# Patient Record
Sex: MALE | Race: WHITE | Employment: OTHER | URBAN - NONMETROPOLITAN AREA
[De-identification: names, ages, dates, MRNs, and addresses within clinical notes are randomized per-mention and may not be internally consistent; named-entity substitution may affect disease eponyms.]

---

## 2017-04-28 ENCOUNTER — OFFICE VISIT (OUTPATIENT)
Dept: FAMILY MEDICINE | Facility: OTHER | Age: 72
End: 2017-04-28
Attending: FAMILY MEDICINE
Payer: MEDICARE

## 2017-04-28 VITALS
HEART RATE: 84 BPM | DIASTOLIC BLOOD PRESSURE: 92 MMHG | TEMPERATURE: 98.3 F | OXYGEN SATURATION: 95 % | RESPIRATION RATE: 16 BRPM | SYSTOLIC BLOOD PRESSURE: 126 MMHG

## 2017-04-28 DIAGNOSIS — N40.0 BENIGN NODULAR PROSTATIC HYPERPLASIA WITHOUT LOWER URINARY TRACT SYMPTOMS: Primary | ICD-10-CM

## 2017-04-28 DIAGNOSIS — R10.13 ABDOMINAL PAIN, EPIGASTRIC: ICD-10-CM

## 2017-04-28 DIAGNOSIS — R10.9 FLANK PAIN: ICD-10-CM

## 2017-04-28 DIAGNOSIS — M54.6 ACUTE RIGHT-SIDED THORACIC BACK PAIN: ICD-10-CM

## 2017-04-28 DIAGNOSIS — Z12.5 SCREENING FOR PROSTATE CANCER: ICD-10-CM

## 2017-04-28 DIAGNOSIS — L98.9 SKIN LESION: ICD-10-CM

## 2017-04-28 DIAGNOSIS — Z23 NEED FOR PROPHYLACTIC VACCINATION AND INOCULATION AGAINST COMBINATIONS OF DISEASE: ICD-10-CM

## 2017-04-28 DIAGNOSIS — Z13.6 SCREENING, HEART DISEASE, ISCHEMIC: ICD-10-CM

## 2017-04-28 PROBLEM — M10.9 ACUTE GOUTY ARTHRITIS: Status: ACTIVE | Noted: 2017-04-28

## 2017-04-28 LAB
ALBUMIN SERPL-MCNC: 3.9 G/DL (ref 3.4–5)
ALBUMIN UR-MCNC: NEGATIVE MG/DL
ALP SERPL-CCNC: 69 U/L (ref 40–150)
ALT SERPL W P-5'-P-CCNC: 44 U/L (ref 0–70)
ANION GAP SERPL CALCULATED.3IONS-SCNC: 10 MMOL/L (ref 3–14)
APPEARANCE UR: CLEAR
AST SERPL W P-5'-P-CCNC: 18 U/L (ref 0–45)
BASOPHILS # BLD AUTO: 0 10E9/L (ref 0–0.2)
BASOPHILS NFR BLD AUTO: 0.5 %
BILIRUB SERPL-MCNC: 0.7 MG/DL (ref 0.2–1.3)
BILIRUB UR QL STRIP: NEGATIVE
BUN SERPL-MCNC: 22 MG/DL (ref 7–30)
CALCIUM SERPL-MCNC: 8.7 MG/DL (ref 8.5–10.1)
CHLORIDE SERPL-SCNC: 107 MMOL/L (ref 94–109)
CHOLEST SERPL-MCNC: 192 MG/DL
CO2 SERPL-SCNC: 24 MMOL/L (ref 20–32)
COLOR UR AUTO: YELLOW
CREAT SERPL-MCNC: 0.81 MG/DL (ref 0.66–1.25)
DIFFERENTIAL METHOD BLD: NORMAL
EOSINOPHIL # BLD AUTO: 0.1 10E9/L (ref 0–0.7)
EOSINOPHIL NFR BLD AUTO: 1.6 %
ERYTHROCYTE [DISTWIDTH] IN BLOOD BY AUTOMATED COUNT: 12.7 % (ref 10–15)
GFR SERPL CREATININE-BSD FRML MDRD: ABNORMAL ML/MIN/1.7M2
GLUCOSE SERPL-MCNC: 119 MG/DL (ref 70–99)
GLUCOSE UR STRIP-MCNC: NEGATIVE MG/DL
HCT VFR BLD AUTO: 46.7 % (ref 40–53)
HDLC SERPL-MCNC: 53 MG/DL
HGB BLD-MCNC: 16.2 G/DL (ref 13.3–17.7)
HGB UR QL STRIP: NEGATIVE
IMM GRANULOCYTES # BLD: 0 10E9/L (ref 0–0.4)
IMM GRANULOCYTES NFR BLD: 0.3 %
KETONES UR STRIP-MCNC: NEGATIVE MG/DL
LDLC SERPL CALC-MCNC: 113 MG/DL
LEUKOCYTE ESTERASE UR QL STRIP: NEGATIVE
LYMPHOCYTES # BLD AUTO: 2.2 10E9/L (ref 0.8–5.3)
LYMPHOCYTES NFR BLD AUTO: 25.4 %
MCH RBC QN AUTO: 32 PG (ref 26.5–33)
MCHC RBC AUTO-ENTMCNC: 34.7 G/DL (ref 31.5–36.5)
MCV RBC AUTO: 92 FL (ref 78–100)
MONOCYTES # BLD AUTO: 0.6 10E9/L (ref 0–1.3)
MONOCYTES NFR BLD AUTO: 6.7 %
NEUTROPHILS # BLD AUTO: 5.7 10E9/L (ref 1.6–8.3)
NEUTROPHILS NFR BLD AUTO: 65.5 %
NITRATE UR QL: NEGATIVE
NONHDLC SERPL-MCNC: 139 MG/DL
NRBC # BLD AUTO: 0 10*3/UL
NRBC BLD AUTO-RTO: 0 /100
PH UR STRIP: 5.5 PH (ref 4.7–8)
PLATELET # BLD AUTO: 170 10E9/L (ref 150–450)
POTASSIUM SERPL-SCNC: 4.2 MMOL/L (ref 3.4–5.3)
PROT SERPL-MCNC: 7.7 G/DL (ref 6.8–8.8)
PSA SERPL-ACNC: 0.78 UG/L (ref 0–4)
RBC # BLD AUTO: 5.06 10E12/L (ref 4.4–5.9)
SODIUM SERPL-SCNC: 141 MMOL/L (ref 133–144)
SP GR UR STRIP: 1.01 (ref 1–1.03)
TRIGL SERPL-MCNC: 129 MG/DL
URN SPEC COLLECT METH UR: NORMAL
UROBILINOGEN UR STRIP-MCNC: NORMAL MG/DL (ref 0–2)
WBC # BLD AUTO: 8.7 10E9/L (ref 4–11)

## 2017-04-28 PROCEDURE — 99212 OFFICE O/P EST SF 10 MIN: CPT

## 2017-04-28 PROCEDURE — 36415 COLL VENOUS BLD VENIPUNCTURE: CPT | Performed by: FAMILY MEDICINE

## 2017-04-28 PROCEDURE — 81003 URINALYSIS AUTO W/O SCOPE: CPT | Performed by: FAMILY MEDICINE

## 2017-04-28 PROCEDURE — 90714 TD VACC NO PRESV 7 YRS+ IM: CPT | Performed by: FAMILY MEDICINE

## 2017-04-28 PROCEDURE — 85025 COMPLETE CBC W/AUTO DIFF WBC: CPT | Performed by: FAMILY MEDICINE

## 2017-04-28 PROCEDURE — 90472 IMMUNIZATION ADMIN EACH ADD: CPT | Mod: GY | Performed by: FAMILY MEDICINE

## 2017-04-28 PROCEDURE — G0009 ADMIN PNEUMOCOCCAL VACCINE: HCPCS | Performed by: FAMILY MEDICINE

## 2017-04-28 PROCEDURE — G0103 PSA SCREENING: HCPCS | Performed by: FAMILY MEDICINE

## 2017-04-28 PROCEDURE — 90471 IMMUNIZATION ADMIN: CPT | Performed by: FAMILY MEDICINE

## 2017-04-28 PROCEDURE — 80061 LIPID PANEL: CPT | Performed by: FAMILY MEDICINE

## 2017-04-28 PROCEDURE — 84153 ASSAY OF PSA TOTAL: CPT | Performed by: FAMILY MEDICINE

## 2017-04-28 PROCEDURE — 80053 COMPREHEN METABOLIC PANEL: CPT | Performed by: FAMILY MEDICINE

## 2017-04-28 PROCEDURE — 90732 PPSV23 VACC 2 YRS+ SUBQ/IM: CPT | Performed by: FAMILY MEDICINE

## 2017-04-28 PROCEDURE — 99215 OFFICE O/P EST HI 40 MIN: CPT | Performed by: FAMILY MEDICINE

## 2017-04-28 ASSESSMENT — PAIN SCALES - GENERAL: PAINLEVEL: MODERATE PAIN (4)

## 2017-04-28 NOTE — PROGRESS NOTES
SUBJECTIVE:  Ugo Moss, 72 year old, male is seen with the following medical issues.    He has developed recurrent right flank and thoracic pain. Present over the last several weeks.  He describes this as a dull and aching sensation.  This will improve with position.  Ugo is concerned as his brother was recently diagnosed with stomach cancer.  He notes no traumas.  No radiating symptoms.    In addition, he notes intermittent reflux symptoms.  He spends a fair amount of time with his brother who is H pylori positive.  Requests testing.    Follow up benign prostatic hypertrophy.  His symptoms are controlled and do not require medication..  He has not had a recent PSA.    He notes a skin lesion. Present over the last several months.  Located on the dorsum of the left hand. Somewhat scaling in nature.  Ugo spends the womack in [place].    Requests screening labs.    Immunizations to be updated.    Denies chest pain, dyspnea, decreased exercise tolerance, dizzyness, nausea, vomiting, diaphoresis, palpitations, numbness, tingling, or paresthesias.      No current outpatient prescriptions on file.        Allergies   Allergen Reactions     Amoxicillin      Amoxicillin Trihydrate Other (See Comments)     Augmentin       Clavulanic Acid Potassium Other (See Comments)     Augmentin         Past Medical History:   Diagnosis Date     BPH 11/28/2011     Erectile Dysfunction 11/28/2011     History reviewed. No pertinent surgical history.  History reviewed. No pertinent family history.  Social History     Social History     Marital status:      Spouse name: N/A     Number of children: N/A     Years of education: N/A     Occupational History     Not on file.     Social History Main Topics     Smoking status: Never Smoker     Smokeless tobacco: Never Used     Alcohol use Yes      Comment: wine 3-4 times weekly     Drug use: No     Sexual activity: Not on file     Other Topics Concern     Not on file     Social  History Narrative         Review Of Systems  Constitutional, HEENT, cardiovascular, pulmonary, gi and gu systems are negative, except as otherwise noted.    OBJECTIVE:  Vitals: B/P: 126/92, T: 98.3, P: 84, R: 16    Exam:  Physical Exam   Constitutional: He is oriented to person, place, and time. He appears well-developed and well-nourished. No distress.   Neurological: He is alert and oriented to person, place, and time.   Skin: Skin is warm and dry.   There is what appears to be an actinic keratosis noted in the area described above.   Psychiatric: He has a normal mood and affect.     Other exam not repeated    Labs:  Office Visit on 10/28/2016   Component Date Value Ref Range Status     Color Urine 10/28/2016 Yellow   Final     Appearance Urine 10/28/2016 Clear   Final     Glucose Urine 10/28/2016 Negative  NEG mg/dL Final     Bilirubin Urine 10/28/2016 Negative  NEG Final     Ketones Urine 10/28/2016 Negative  NEG mg/dL Final     Specific Gravity Urine 10/28/2016 1.012  1.003 - 1.035 Final     Blood Urine 10/28/2016 Negative  NEG Final     pH Urine 10/28/2016 5.5  4.7 - 8.0 pH Final     Protein Albumin Urine 10/28/2016 Negative  NEG mg/dL Final     Urobilinogen mg/dL 10/28/2016 Normal  0.0 - 2.0 mg/dL Final     Nitrite Urine 10/28/2016 Negative  NEG Final     Leukocyte Esterase Urine 10/28/2016 Negative  NEG Final     Source 10/28/2016 Midstream Urine   Final     WBC Urine 10/28/2016 1  0 - 2 /HPF Final     RBC Urine 10/28/2016 <1  0 - 2 /HPF Final     Mucous Urine 10/28/2016 Present* NEG /LPF Final     WBC 10/28/2016 6.8  4.0 - 11.0 10e9/L Final     RBC Count 10/28/2016 5.39  4.4 - 5.9 10e12/L Final     Hemoglobin 10/28/2016 17.5  13.3 - 17.7 g/dL Final     Hematocrit 10/28/2016 50.0  40.0 - 53.0 % Final     MCV 10/28/2016 93  78 - 100 fl Final     MCH 10/28/2016 32.5  26.5 - 33.0 pg Final     MCHC 10/28/2016 35.0  31.5 - 36.5 g/dL Final     RDW 10/28/2016 12.4  10.0 - 15.0 % Final     Platelet Count 10/28/2016  197  150 - 450 10e9/L Final     Diff Method 10/28/2016 Automated Method   Final     % Neutrophils 10/28/2016 55.4  % Final     % Lymphocytes 10/28/2016 34.1  % Final     % Monocytes 10/28/2016 7.4  % Final     % Eosinophils 10/28/2016 2.1  % Final     % Basophils 10/28/2016 0.9  % Final     % Immature Granulocytes 10/28/2016 0.1  % Final     Nucleated RBCs 10/28/2016 0  0 /100 Final     Absolute Neutrophil 10/28/2016 3.8  1.6 - 8.3 10e9/L Final     Absolute Lymphocytes 10/28/2016 2.3  0.8 - 5.3 10e9/L Final     Absolute Monocytes 10/28/2016 0.5  0.0 - 1.3 10e9/L Final     Absolute Eosinophils 10/28/2016 0.1  0.0 - 0.7 10e9/L Final     Absolute Basophils 10/28/2016 0.1  0.0 - 0.2 10e9/L Final     Abs Immature Granulocytes 10/28/2016 0.0  0 - 0.4 10e9/L Final     Absolute Nucleated RBC 10/28/2016 0.0   Final     PSA 10/28/2016 0.83  0 - 4 ug/L Final    Assay Method:  Chemiluminescence using Siemens Vista analyzer     Sodium 10/28/2016 139  133 - 144 mmol/L Final     Potassium 10/28/2016 4.4  3.4 - 5.3 mmol/L Final     Chloride 10/28/2016 107  94 - 109 mmol/L Final     Carbon Dioxide 10/28/2016 24  20 - 32 mmol/L Final     Anion Gap 10/28/2016 8  3 - 14 mmol/L Final     Glucose 10/28/2016 113* 70 - 99 mg/dL Final     Urea Nitrogen 10/28/2016 20  7 - 30 mg/dL Final     Creatinine 10/28/2016 1.00  0.66 - 1.25 mg/dL Final     GFR Estimate 10/28/2016 74  >60 mL/min/1.7m2 Final    Non  GFR Calc     GFR Estimate If Black 10/28/2016 89  >60 mL/min/1.7m2 Final    African American GFR Calc     Calcium 10/28/2016 8.8  8.5 - 10.1 mg/dL Final     Bilirubin Total 10/28/2016 0.5  0.2 - 1.3 mg/dL Final     Albumin 10/28/2016 4.1  3.4 - 5.0 g/dL Final     Protein Total 10/28/2016 8.2  6.8 - 8.8 g/dL Final     Alkaline Phosphatase 10/28/2016 75  40 - 150 U/L Final     ALT 10/28/2016 38  0 - 70 U/L Final     AST 10/28/2016 17  0 - 45 U/L Final       ASSESSMENT/PLAN:  Acute right-sided thoracic back pain  Suspect  musculoskeletal in nature.  Continue over the counter nonsteroidal anti inflammatory drugs, stretching, and heat.    Flank pain  UA reviewed and negative.  No evidence of stone.  He has a previous history of prostatitis.  Will follow.  - UA reflex to Microscopic and Culture  - CBC with platelets differential    Abdominal pain, epigastric  Most recent CT reviewed and showed no evidence of malignancy.  Suspect GERD.  H pylori breath test scheduled.  Follow up as arranged.  - H pylori breath test; Future    BPH (benign prostatic hyperplasia)  Symptoms controlled without medications.  Will follow.    Skin lesion  Appears to be actinic in nature.  This is discussed.  Recommend Dermatology referral for general skin exam  - DERMATOLOGY REFERRAL    Screening for prostate cancer  PSA drawn.  - Prostate spec antigen screen    Screening, heart disease, ischemic  Fasting labs are drawn.  - Comprehensive metabolic panel  - Lipid Profile    Need for prophylactic vaccination and inoculation against combinations of disease  Immunizations updated.  - ADMIN 1st VACCINE  - VACCINE ADMINISTRATION, EACH ADDITIONAL  - PNEUMOCOCCAL VACCINE,ADULT,SQ OR IM  - C TD PRSERV FREE >=7 YRS ADS IM      Greater than 40 minutes spent with the patient, of which over 50 % spent reviewing medical problems and formulating diagnostic plan, counseling regarding health care issues., and coordination of care.            Mahad Henley MD

## 2017-04-28 NOTE — MR AVS SNAPSHOT
After Visit Summary   4/28/2017    Ugo Moss    MRN: [id number]           Patient Information     Date Of Birth          1945        Visit Information        Provider Department      4/28/2017 11:00 AM Mahad Henley MD [facility] [place]        Today's Diagnoses     BPH (benign prostatic hyperplasia)    -  1    Acute right-sided thoracic back pain        Flank pain        Abdominal pain, epigastric        Skin lesion        Screening for prostate cancer        Screening, heart disease, ischemic        Need for prophylactic vaccination and inoculation against combinations of disease          Care Instructions    H pylori scheduled.  Appointment with Dermatology scheduled for evaluation and general skin examt.         Follow-ups after your visit        Additional Services     DERMATOLOGY REFERRAL       Your provider has referred you to: Dr Ingris Yeboah [facility]    General Skin Exam    Please be aware that coverage of these services is subject to the terms and limitations of your health insurance plan.  Call member services at your health plan with any benefit or coverage questions.      Please bring the following to your appointment:  Any x-rays, CTs or MRIs which have been performed.  Contact the facility where they were done to arrange for  prior to your scheduled appointment.  Any new CT, MRI or other procedures ordered by your specialist must be performed at a [facility] facility or coordinated by your clinic's referral office.    List of current medications   This referral request   Any documents/labs given to you for this referral                  Follow-up notes from your care team     Return in about 1 week (around 5/5/2017) for Test Results.      Who to contact     If you have questions or need follow up information about today's clinic visit or your schedule please contact [facility] directly at [phone].  Normal or non-critical lab and imaging results  will be communicated to you by [employer], letter or phone within 4 business days after the clinic has received the results. If you do not hear from us within 7 days, please contact the clinic through [employer] or phone. If you have a critical or abnormal lab result, we will notify you by phone as soon as possible.  Submit refill requests through [employer] or call your pharmacy and they will forward the refill request to us. Please allow 3 business days for your refill to be completed.          Additional Information About Your Visit        [employer] Information     [employer] gives you secure access to your electronic health record. If you see a primary care provider, you can also send messages to your care team and make appointments. If you have questions, please call your primary care clinic.  If you do not have a primary care provider, please call [phone] and they will assist you.        Care EveryWhere ID     This is your Care EveryWhere ID. This could be used by other organizations to access your [facility] medical records  [id number]        Your Vitals Were     Pulse Temperature Respirations Pulse Oximetry          84 98.3  F (36.8  C) (Tympanic) 16 95%         Blood Pressure from Last 3 Encounters:   04/28/17 (!) 126/92   10/28/16 (!) 142/94   03/09/15 140/80    Weight from Last 3 Encounters:   10/28/16 222 lb (100.7 kg)   03/09/15 195 lb (88.5 kg)   03/05/15 198 lb (89.8 kg)              We Performed the Following     ADMIN 1st VACCINE     C TD PRSERV FREE >=7 YRS ADS IM     CBC with platelets differential     Comprehensive metabolic panel     DERMATOLOGY REFERRAL     Lipid Profile     PNEUMOCOCCAL VACCINE,ADULT,SQ OR IM     Prostate spec antigen screen     UA reflex to Microscopic and Culture     VACCINE ADMINISTRATION, EACH ADDITIONAL          Today's Medication Changes          These changes are accurate as of: 4/28/17 11:59 PM.  If you have any questions, ask your nurse or doctor.               Stop taking  these medicines if you haven't already. Please contact your care team if you have questions.     ciprofloxacin 500 MG tablet   Commonly known as:  CIPRO   Stopped by:  Mahad Henley MD                    Primary Care Provider Office Phone # Fax #    Mahad Henley [phone]       [facility] [address] [place] [address]        Thank you!     Thank you for choosing [facility]  for your care. Our goal is always to provide you with excellent care. Hearing back from our patients is one way we can continue to improve our services. Please take a few minutes to complete the written survey that you may receive in the mail after your visit with us. Thank you!             Your Updated Medication List - Protect others around you: Learn how to safely use, store and throw away your medicines at www.disposemymeds.org.      Notice  As of 4/28/2017 11:59 PM    You have not been prescribed any medications.

## 2017-05-01 NOTE — PATIENT INSTRUCTIONS
H pylori scheduled.  Appointment with Dermatology scheduled for evaluation and general skin examt.

## 2017-05-19 ENCOUNTER — MYC MEDICAL ADVICE (OUTPATIENT)
Dept: FAMILY MEDICINE | Facility: OTHER | Age: 72
End: 2017-05-19

## 2017-05-19 DIAGNOSIS — R59.1 LA (LYMPHADENOPATHY): Primary | ICD-10-CM

## 2017-05-25 DIAGNOSIS — M10.9 ACUTE GOUTY ARTHRITIS: Primary | ICD-10-CM

## 2017-05-25 DIAGNOSIS — R10.13 ABDOMINAL PAIN, EPIGASTRIC: Primary | ICD-10-CM

## 2017-05-30 DIAGNOSIS — R59.1 LA (LYMPHADENOPATHY): Primary | ICD-10-CM

## 2017-05-31 DIAGNOSIS — R59.1 LA (LYMPHADENOPATHY): ICD-10-CM

## 2017-05-31 DIAGNOSIS — R10.13 ABDOMINAL PAIN, EPIGASTRIC: ICD-10-CM

## 2017-05-31 DIAGNOSIS — M10.9 ACUTE GOUTY ARTHRITIS: ICD-10-CM

## 2017-05-31 LAB
BASOPHILS # BLD AUTO: 0.1 10E9/L (ref 0–0.2)
BASOPHILS NFR BLD AUTO: 1.1 %
COPATH REPORT: NORMAL
DIFFERENTIAL METHOD BLD: NORMAL
EOSINOPHIL # BLD AUTO: 0.2 10E9/L (ref 0–0.7)
EOSINOPHIL NFR BLD AUTO: 3.1 %
ERYTHROCYTE [DISTWIDTH] IN BLOOD BY AUTOMATED COUNT: 12.7 % (ref 10–15)
HCT VFR BLD AUTO: 48.9 % (ref 40–53)
HGB BLD-MCNC: 16.8 G/DL (ref 13.3–17.7)
IMM GRANULOCYTES # BLD: 0 10E9/L (ref 0–0.4)
IMM GRANULOCYTES NFR BLD: 0.3 %
LYMPHOCYTES # BLD AUTO: 2.3 10E9/L (ref 0.8–5.3)
LYMPHOCYTES NFR BLD AUTO: 37.1 %
MCH RBC QN AUTO: 31.5 PG (ref 26.5–33)
MCHC RBC AUTO-ENTMCNC: 34.4 G/DL (ref 31.5–36.5)
MCV RBC AUTO: 92 FL (ref 78–100)
MONOCYTES # BLD AUTO: 0.5 10E9/L (ref 0–1.3)
MONOCYTES NFR BLD AUTO: 7.8 %
NEUTROPHILS # BLD AUTO: 3.1 10E9/L (ref 1.6–8.3)
NEUTROPHILS NFR BLD AUTO: 50.6 %
NRBC # BLD AUTO: 0 10*3/UL
NRBC BLD AUTO-RTO: 0 /100
PLATELET # BLD AUTO: 178 10E9/L (ref 150–450)
RBC # BLD AUTO: 5.33 10E12/L (ref 4.4–5.9)
RETICS # AUTO: 90.6 10E9/L (ref 25–95)
RETICS/RBC NFR AUTO: 1.7 % (ref 0.5–2)
WBC # BLD AUTO: 6.1 10E9/L (ref 4–11)

## 2017-05-31 PROCEDURE — 85025 COMPLETE CBC W/AUTO DIFF WBC: CPT | Mod: ZL | Performed by: FAMILY MEDICINE

## 2017-05-31 PROCEDURE — 85045 AUTOMATED RETICULOCYTE COUNT: CPT | Mod: ZL | Performed by: FAMILY MEDICINE

## 2017-05-31 PROCEDURE — 40000847 ZZHCL STATISTIC MORPHOLOGY W/INTERP HISTOLOGY TC 85060: Mod: ZL | Performed by: FAMILY MEDICINE

## 2017-05-31 PROCEDURE — 36415 COLL VENOUS BLD VENIPUNCTURE: CPT | Mod: ZL | Performed by: FAMILY MEDICINE

## 2017-05-31 PROCEDURE — 78267 UREA BREATH TST C-14 ACQUISJ: CPT | Mod: ZL | Performed by: FAMILY MEDICINE

## 2017-05-31 PROCEDURE — 78268 UREA BREATH TEST C-14 ALYS: CPT | Mod: ZL | Performed by: FAMILY MEDICINE

## 2017-06-01 ENCOUNTER — MYC MEDICAL ADVICE (OUTPATIENT)
Dept: FAMILY MEDICINE | Facility: OTHER | Age: 72
End: 2017-06-01

## 2017-06-01 LAB — UREA BREATH TEST QL: 12 DPM

## 2017-06-29 ENCOUNTER — MYC MEDICAL ADVICE (OUTPATIENT)
Dept: FAMILY MEDICINE | Facility: OTHER | Age: 72
End: 2017-06-29

## 2017-06-29 DIAGNOSIS — L02.92 FURUNCLE OF SKIN OR SUBCUTANEOUS TISSUE: Primary | ICD-10-CM

## 2017-06-30 RX ORDER — CEPHALEXIN 500 MG/1
500 CAPSULE ORAL 3 TIMES DAILY
Qty: 30 CAPSULE | Refills: 0 | Status: SHIPPED | OUTPATIENT
Start: 2017-06-30 | End: 2017-07-14

## 2017-07-05 ENCOUNTER — OFFICE VISIT (OUTPATIENT)
Dept: SURGERY | Facility: OTHER | Age: 72
End: 2017-07-05
Attending: SURGERY
Payer: MEDICARE

## 2017-07-05 VITALS
OXYGEN SATURATION: 97 % | WEIGHT: 215 LBS | HEIGHT: 70 IN | BODY MASS INDEX: 30.78 KG/M2 | DIASTOLIC BLOOD PRESSURE: 80 MMHG | SYSTOLIC BLOOD PRESSURE: 130 MMHG | TEMPERATURE: 97.4 F | HEART RATE: 79 BPM

## 2017-07-05 DIAGNOSIS — L72.3 SEBACEOUS CYST: Primary | ICD-10-CM

## 2017-07-05 PROCEDURE — 12032 INTMD RPR S/A/T/EXT 2.6-7.5: CPT | Performed by: SURGERY

## 2017-07-05 PROCEDURE — 11406 EXC TR-EXT B9+MARG >4.0 CM: CPT | Performed by: SURGERY

## 2017-07-05 PROCEDURE — 88304 TISSUE EXAM BY PATHOLOGIST: CPT | Mod: TC | Performed by: SURGERY

## 2017-07-05 PROCEDURE — 99214 OFFICE O/P EST MOD 30 MIN: CPT | Mod: 25

## 2017-07-05 ASSESSMENT — PAIN SCALES - GENERAL: PAINLEVEL: MODERATE PAIN (4)

## 2017-07-05 NOTE — PATIENT INSTRUCTIONS
Thank you for allowing Dr. Tomas and our surgical team to participate in your care.  If you have a scheduling or an appointment question please contact Est[facility]an our Health Unit Coordinator at her direct line [phone].   ALL nursing questions or concerns can be directed to your surgical nurse at: [phone] for [facility].      POST PROCEDURE INSTRUCTIONS    Leave your dressing on for 48 hours, remove dressing    Do not soak and scrub the area. Let the soapy water run over the area.     Keep incision clean and dry   Do NOT soak in water such as a tub bath or swimming   Do NOT put make-up, deodorant, powders, hairspray, lotions, etc on the incision    Apply antibiotic ointment daily    Cover with a clean dressing daily or when wet/soiled    If you have steri-strips, these will fall off on their own in 7 days. If they are still adhered after 7 days, you may remove them by pulling gently.     Do NOT use aspirin/NSAIDS (Motrin, Ibuprofen, Aleve, etc..) for 7 days    You can use acetaminophen(Tylenol) or the prescription you received for pain.       If you have any bleeding, cover the wound with clean gauze and hold pressure for 10 Minutes. If the bleeding does not stop or is heavy and profuse, call the clinic or go to the Urgent Care/Emergency Department.      SIGNS OF INFECTION ARE:    Redness, swelling, red streaks, pus, drainage, warmth, fever, increased pain, foul smell.     Contact your health care provider if you notice any of the warning signs.     FOLLOW - UP    Follow-up in clinic with Dr. Tomas on  for suture removal.     Pathology results will also be discussed at that time.

## 2017-07-05 NOTE — MR AVS SNAPSHOT
After Visit Summary   7/5/2017    Ugo Moss    MRN: [id number]           Patient Information     Date Of Birth          1945        Visit Information        Provider Department      7/5/2017 3:30 PM Christin Tomas MD [facility] [place]        Today's Diagnoses     Sebaceous cyst    -  1      Care Instructions    Thank you for allowing Dr. Tomas and our surgical team to participate in your care.  If you have a scheduling or an appointment question please contact [facility] our Health Unit Coordinator at her direct line [phone].   ALL nursing questions or concerns can be directed to your surgical nurse at: [phone] for [facility].      POST PROCEDURE INSTRUCTIONS    Leave your dressing on for 48 hours, remove dressing    Do not soak and scrub the area. Let the soapy water run over the area.     Keep incision clean and dry   Do NOT soak in water such as a tub bath or swimming   Do NOT put make-up, deodorant, powders, hairspray, lotions, etc on the incision    Apply antibiotic ointment daily    Cover with a clean dressing daily or when wet/soiled    If you have steri-strips, these will fall off on their own in 7 days. If they are still adhered after 7 days, you may remove them by pulling gently.     Do NOT use aspirin/NSAIDS (Motrin, Ibuprofen, Aleve, etc..) for 7 days    You can use acetaminophen(Tylenol) or the prescription you received for pain.       If you have any bleeding, cover the wound with clean gauze and hold pressure for 10 Minutes. If the bleeding does not stop or is heavy and profuse, call the clinic or go to the Urgent Care/Emergency Department.      SIGNS OF INFECTION ARE:    Redness, swelling, red streaks, pus, drainage, warmth, fever, increased pain, foul smell.     Contact your health care provider if you notice any of the warning signs.     FOLLOW - UP    Follow-up in clinic with Dr. Tomas on  for suture removal.     Pathology results will also be discussed at that  time.                 Follow-ups after your visit        Your next 10 appointments already scheduled     Jul 05, 2017  3:30 PM CDT   (Arrive by 3:15 PM)   PROCEDURE with Christin Tomas MD   [facility] ([facility]    [address] [place] [address]   [phone]            Sep 18, 2017  2:00 PM CDT   (Arrive by 1:45 PM)   New Visit with KOLE Amado MD   [facility] ([facility]    [address] [place] [id number] [phone]              Who to contact     If you have questions or need follow up information about today's clinic visit or your schedule please contact [facility] directly at [phone].  Normal or non-critical lab and imaging results will be communicated to you by MyChart, letter or phone within 4 business days after the clinic has received the results. If you do not hear from us within 7 days, please contact the clinic through [employer] or phone. If you have a critical or abnormal lab result, we will notify you by phone as soon as possible.  Submit refill requests through [employer] or call your pharmacy and they will forward the refill request to us. Please allow 3 business days for your refill to be completed.          Additional Information About Your Visit        [employer] Information     [employer] gives you secure access to your electronic health record. If you see a primary care provider, you can also send messages to your care team and make appointments. If you have questions, please call your primary care clinic.  If you do not have a primary care provider, please call [phone] and they will assist you.        Care EveryWhere ID     This is your Care EveryWhere ID. This could be used by other organizations to access your [facility] medical records  [id number]        Your Vitals Were     Pulse Temperature Height Pulse Oximetry BMI (Body Mass Index)       79 97.4  F (36.3  C) (Tympanic) 5'  "10\" (1.778 m) 97% 30.85 kg/m2        Blood Pressure from Last 3 Encounters:   07/05/17 130/80   04/28/17 (!) 126/92   10/28/16 (!) 142/94    Weight from Last 3 Encounters:   07/05/17 215 lb (97.5 kg)   10/28/16 222 lb (100.7 kg)   03/09/15 195 lb (88.5 kg)              We Performed the Following     Surgical pathology exam     SURGICAL TRAY-- MINOR        Primary Care Provider Office Phone # Fax #    Mahad Henley [phone]       [facility] [address] [address]        Equal Access to Services     EVANGELINA AL : Hadii anthony culp hadasho Sokim, waaxda luqadaha, qaybta kaalmada adeegyada, arsenio vences . So [facility] [phone].    ATENCIÓN: Si habla español, tiene a herrera disposición servicios gratuitos de asistencia lingüística. Llame al [phone].    We comply with applicable federal civil rights laws and [place] laws. We do not discriminate on the basis of race, color, national origin, age, disability sex, sexual orientation or gender identity.            Thank you!     Thank you for choosing [facility]  for your care. Our goal is always to provide you with excellent care. Hearing back from our patients is one way we can continue to improve our services. Please take a few minutes to complete the written survey that you may receive in the mail after your visit with us. Thank you!             Your Updated Medication List - Protect others around you: Learn how to safely use, store and throw away your medicines at www.disposemymeds.org.          This list is accurate as of: 7/5/17  1:48 PM.  Always use your most recent med list.                   Brand Name Dispense Instructions for use Diagnosis    cephALEXin 500 MG capsule    KEFLEX    30 capsule    Take 1 capsule (500 mg) by mouth 3 times daily    Furuncle of skin or subcutaneous tissue         "

## 2017-07-05 NOTE — NURSING NOTE
"Chief Complaint   Patient presents with     Procedure     skin lesion lower back, referred by Dr. Henley       Initial /80  Pulse 79  Temp 97.4  F (36.3  C) (Tympanic)  Ht 5' 10\" (1.778 m)  Wt 215 lb (97.5 kg)  SpO2 97%  BMI 30.85 kg/m2 Estimated body mass index is 30.85 kg/(m^2) as calculated from the following:    Height as of this encounter: 5' 10\" (1.778 m).    Weight as of this encounter: 215 lb (97.5 kg).  Medication Reconciliation: complete     Davida Morrison      "

## 2017-07-07 LAB — COPATH REPORT: NORMAL

## 2017-07-10 ENCOUNTER — TELEPHONE (OUTPATIENT)
Dept: SURGERY | Facility: OTHER | Age: 72
End: 2017-07-10

## 2017-07-10 NOTE — PROGRESS NOTES
"HPI  This 72 year old male is seen at the request of Dr. Henley for evaluation and definitive management of a recurrent sebaceous cyst.  The lesion became acutely inflamed and I&D was performed in March of 2015.  Recurrent symptoms prompted medical management with antibiotic therapy and the patient was referred for surgical evaluation with a view to definitive excision.    ROS  10 point ROS neg other than the symptoms noted above in the HPI.    Physical Exam  /80  Pulse 79  Temp 97.4  F (36.3  C) (Tympanic)  Ht 1.778 m (5' 10\")  Wt 97.5 kg (215 lb)  SpO2 97%  BMI 30.85 kg/m2      2.5 cm length erythematous sebaceous cyst with midline vertical scar, lower back.  Somewhat fluctuant without evidence of surrounding cellulitis.  An excision would need to include the entire old site as cyst wall retention underlies the recurrence.    Pathophysiology of all outlined with risk/benefits of excision and the potential of wound infection necessitating opening and healing by secondary intention in view of the recent inflammation.    He voices understanding and accepts. This can certainly be approached under local anesthesia in the minor procedure room and the patient wishes to proceed today.  The risks/benefits and technical aspects with cosmetic and recuperative expectations were reviewed.  Questions were asked and answered - will proceed under informed written consent.    Christin Tomas MD, FACS               "

## 2017-07-10 NOTE — TELEPHONE ENCOUNTER
Writer spoke with patient to check on how he is doing since excision on 7/5/17, patient doing well, he is not having to take any tylenol for pain management anymore and he would like to get his stitches removed at [facility]. Writer told patient she would set this up for him when she is at that clinic tomorrow but that it would not be a problem at all. Patient thanked writer for the call. Patient also stated no bleeding from site, no n/v fevers or chills have been noted since excision.    Davida Morrison

## 2017-07-10 NOTE — PROGRESS NOTES
[facility] Surgery Procedure Note    Procedure: Excision 5.0 cm length, sebaceous cyst back    Layered closure excision site.      The lower back was prepped and draped sterilely.  The timeout pause was observed during which the patient confirmed his correct identity, side, site and nature of procedure.  Local anesthesia was obtained with infiltration of 2% xylocaine.  An ellipse was fashioned to include grossly clear margins with the incision taken through full thickness skin to the subcutaneous tissue.  Full thickness skin and subcutaneous tissue deep to the cyst proper was elevated off fibrotic soft tissue resulting from the prior intervention and the lesion was excised in toto.   The wound was irrigated with sterile saline.  Layered closure was accomplished with interrupted 3-0 Vicryl in the subcutaneous tissue; the skin was reapproximated with interrupted 3-0 Nylon.      The wound was cleansed and antibiotic ointment and a sterile dressing was applied.  The patient was observed in the treatment room for 15 minutes following the procedure without sequelae.  Detailed instructions were provided for wound care and followup appointment.    The procedure was well tolerated and the patient left the clinic in good condition.

## 2017-07-14 ENCOUNTER — ALLIED HEALTH/NURSE VISIT (OUTPATIENT)
Dept: FAMILY MEDICINE | Facility: OTHER | Age: 72
End: 2017-07-14
Attending: SURGERY
Payer: MEDICARE

## 2017-07-14 DIAGNOSIS — L02.92 FURUNCLE OF SKIN OR SUBCUTANEOUS TISSUE: ICD-10-CM

## 2017-07-14 DIAGNOSIS — Z48.02 VISIT FOR SUTURE REMOVAL: Primary | ICD-10-CM

## 2017-07-14 RX ORDER — CEPHALEXIN 500 MG/1
500 CAPSULE ORAL 3 TIMES DAILY
Qty: 30 CAPSULE | Refills: 0 | Status: SHIPPED | OUTPATIENT
Start: 2017-07-14 | End: 2017-08-15

## 2017-07-14 NOTE — NURSING NOTE
Ugo presents to the clinic today for  removal of sutures.  The patient has had the sutures in place for 10 days.    There has been no history of infection or drainage.    O: 6 sutures are seen located on the mid back.  The wound is healing well with no signs of infection.    Tetanus status is up to date.    A: Suture removal.    P:  All sutures were easily removed today.  Steri strips applied.  Routine wound care discussed.  The patient will follow up as needed.    Pamela M Lechevalier LPN

## 2017-07-14 NOTE — MR AVS SNAPSHOT
After Visit Summary   7/14/2017    Ugo Moss    MRN: [id number]           Patient Information     Date Of Birth          1945        Visit Information        Provider Department      7/14/2017 10:15 AM [facility] NURSE [facility]        Today's Diagnoses     Visit for suture removal    -  1       Follow-ups after your visit        Your next 10 appointments already scheduled     Sep 18, 2017  2:00 PM CDT   (Arrive by 1:45 PM)   New Visit with KOLE Amado MD   [facility] [place] ([facility] [place] )    [address]  [place] [id number] [phone]              Who to contact     If you have questions or need follow up information about today's clinic visit or your schedule please contact [facility] directly at [phone].  Normal or non-critical lab and imaging results will be communicated to you by MyChart, letter or phone within 4 business days after the clinic has received the results. If you do not hear from us within 7 days, please contact the clinic through MyChart or phone. If you have a critical or abnormal lab result, we will notify you by phone as soon as possible.  Submit refill requests through Radio NEXT or call your pharmacy and they will forward the refill request to us. Please allow 3 business days for your refill to be completed.          Additional Information About Your Visit        MyChart Information     Radio NEXT gives you secure access to your electronic health record. If you see a primary care provider, you can also send messages to your care team and make appointments. If you have questions, please call your primary care clinic.  If you do not have a primary care provider, please call [phone] and they will assist you.        Care EveryWhere ID     This is your Care EveryWhere ID. This could be used by other organizations to access your [place] medical records  [id number]         Blood Pressure from Last 3  Encounters:   07/05/17 130/80   04/28/17 (!) 126/92   10/28/16 (!) 142/94    Weight from Last 3 Encounters:   07/05/17 215 lb (97.5 kg)   10/28/16 222 lb (100.7 kg)   03/09/15 195 lb (88.5 kg)              Today, you had the following     No orders found for display       Primary Care Provider Office Phone # Fax #    Mahad Henley [phone]       [facility] [address]  [place] [address]        Equal Access to Services     [facility]: Hadii aad ku hadasho Soomaali, waaxda luqadaha, qaybta kaalmada adejosefayakayleigh, arsenio vences . So [facility] [phone].    ATENCIÓN: Si habla español, tiene a herrera disposición servicios gratuitos de asistencia lingüística. Llame al [phone].    We comply with applicable federal civil rights laws and [place] laws. We do not discriminate on the basis of race, color, national origin, age, disability sex, sexual orientation or gender identity.            Thank you!     Thank you for choosing [facility]  for your care. Our goal is always to provide you with excellent care. Hearing back from our patients is one way we can continue to improve our services. Please take a few minutes to complete the written survey that you may receive in the mail after your visit with us. Thank you!             Your Updated Medication List - Protect others around you: Learn how to safely use, store and throw away your medicines at www.disposemymeds.org.          This list is accurate as of: 7/14/17 11:06 AM.  Always use your most recent med list.                   Brand Name Dispense Instructions for use Diagnosis    cephALEXin 500 MG capsule    KEFLEX    30 capsule    Take 1 capsule (500 mg) by mouth 3 times daily    Furuncle of skin or subcutaneous tissue

## 2017-07-18 RX ORDER — CEPHALEXIN 500 MG/1
CAPSULE ORAL
Qty: 30 CAPSULE | Refills: 0 | OUTPATIENT
Start: 2017-07-18

## 2017-08-15 ENCOUNTER — OFFICE VISIT (OUTPATIENT)
Dept: DERMATOLOGY | Facility: OTHER | Age: 72
End: 2017-08-15
Attending: FAMILY MEDICINE
Payer: MEDICARE

## 2017-08-15 DIAGNOSIS — L98.9 SKIN LESION: ICD-10-CM

## 2017-08-15 PROCEDURE — 99212 OFFICE O/P EST SF 10 MIN: CPT

## 2017-08-15 PROCEDURE — 99202 OFFICE O/P NEW SF 15 MIN: CPT | Performed by: DERMATOLOGY

## 2017-08-15 NOTE — NURSING NOTE
"Chief Complaint   Patient presents with     Derm Problem     Skin lesions        Initial There were no vitals taken for this visit. Estimated body mass index is 30.85 kg/(m^2) as calculated from the following:    Height as of 7/5/17: 1.778 m (5' 10\").    Weight as of 7/5/17: 97.5 kg (215 lb).  Medication Reconciliation: complete   Deisi Redd    "

## 2017-08-15 NOTE — MR AVS SNAPSHOT
After Visit Summary   8/15/2017    Ugo Moss    MRN: [id number]           Patient Information     Date Of Birth          1945        Visit Information        Provider Department      8/15/2017 2:45 PM [facility]        Today's Diagnoses     Skin lesion           Follow-ups after your visit        Who to contact     If you have questions or need follow up information about today's clinic visit or your schedule please contact [facility] directly at [phone].  Normal or non-critical lab and imaging results will be communicated to you by MyChart, letter or phone within 4 business days after the clinic has received the results. If you do not hear from us within 7 days, please contact the clinic through Jumping Nutshart or phone. If you have a critical or abnormal lab result, we will notify you by phone as soon as possible.  Submit refill requests through Roadnet or call your pharmacy and they will forward the refill request to us. Please allow 3 business days for your refill to be completed.          Additional Information About Your Visit        MyChart Information     Roadnet gives you secure access to your electronic health record. If you see a primary care provider, you can also send messages to your care team and make appointments. If you have questions, please call your primary care clinic.  If you do not have a primary care provider, please call [phone] and they will assist you.        Care EveryWhere ID     This is your Care EveryWhere ID. This could be used by other organizations to access your [place] medical records  [id number]         Blood Pressure from Last 3 Encounters:   07/05/17 130/80   04/28/17 (!) 126/92   10/28/16 (!) 142/94    Weight from Last 3 Encounters:   07/05/17 97.5 kg (215 lb)   10/28/16 100.7 kg (222 lb)   03/09/15 88.5 kg (195 lb)              Today, you had the following     No orders found for display        Primary Care Provider Office Phone # Fax #    Mahad Henley [phone]       [facility] [address]  [facility] [address]        Equal Access to Services     EVANGELINA [facility] : Hadii anthony culp hadshakeelo Soomaali, waaxda luqadaha, qaybta kaalmada adeegyada, arsenio xavierin hayaaluis m schmidximenabhavin rowell. So [facility] [phone].    ATENCIÓN: Si habla español, tiene a herrera disposición servicios gratuitos de asistencia lingüística. Llame al [phone].    We comply with applicable federal civil rights laws and [place] laws. We do not discriminate on the basis of race, color, national origin, age, disability sex, sexual orientation or gender identity.            Thank you!     Thank you for choosing [facility]  for your care. Our goal is always to provide you with excellent care. Hearing back from our patients is one way we can continue to improve our services. Please take a few minutes to complete the written survey that you may receive in the mail after your visit with us. Thank you!             Your Updated Medication List - Protect others around you: Learn how to safely use, store and throw away your medicines at www.disposemymeds.org.      Notice  As of 8/15/2017 11:59 PM    You have not been prescribed any medications.

## 2017-08-16 NOTE — CONSULTS
DATE OF VISIT:  08/15/2017      SUBJECTIVE:  First visit for Jacob Moss, who is a retired professional , who comes in for a general skin check.  [place] has a home.  He enjoys the outdoors and has had a lot of sun exposure.  Does have numerous lesions, so comes in today for an evaluation at the recommendation of Dr. Henley, a fellow [facility] .         OBJECTIVE:  Shows a healthy gentleman in no distress.  We checked his face, neck, chest, back, arms, legs, back.  He had numerous seborrheic keratoses.  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  The lower lip is white and somewhat wrinkled.  I  advised that this was significant and something that he needs to attend to with respect to using sunblock on his lip when he is out and about.      Otherwise, I felt his skin  was in very good shape and I was not fearful of any problems, though again he has fair skin and sun could be an issue with him as time goes on.      Return in 1 year, sooner if necessary.  I would ask that Dr. Henley watch his lower lip for any bumps or papules that might suggest early squamous cell cancer.  Meds and allergies reviewed.         KOLE YU MD             D: 08/15/2017 23:02   T: 2017 00:33   MT: TD      Name:     JACOB MOSS   MRN:      [id number]        Account:       [id number]   :      1945           Consult Date:  08/15/2017      Document: [id number]       cc: Mahad Henley MD

## 2018-05-31 ENCOUNTER — TRANSFERRED RECORDS (OUTPATIENT)
Dept: HEALTH INFORMATION MANAGEMENT | Facility: CLINIC | Age: 73
End: 2018-05-31

## 2019-01-02 ENCOUNTER — OFFICE VISIT (OUTPATIENT)
Dept: FAMILY MEDICINE | Facility: OTHER | Age: 74
End: 2019-01-02
Attending: FAMILY MEDICINE
Payer: MEDICARE

## 2019-01-02 VITALS
OXYGEN SATURATION: 96 % | BODY MASS INDEX: 32.43 KG/M2 | WEIGHT: 226 LBS | SYSTOLIC BLOOD PRESSURE: 128 MMHG | DIASTOLIC BLOOD PRESSURE: 76 MMHG | HEART RATE: 79 BPM | TEMPERATURE: 96.6 F | RESPIRATION RATE: 16 BRPM

## 2019-01-02 DIAGNOSIS — N40.0 BENIGN NODULAR PROSTATIC HYPERPLASIA WITHOUT LOWER URINARY TRACT SYMPTOMS: ICD-10-CM

## 2019-01-02 DIAGNOSIS — K22.70 BARRETT'S ESOPHAGUS WITHOUT DYSPLASIA: Primary | ICD-10-CM

## 2019-01-02 DIAGNOSIS — L72.3 SEBACEOUS CYST: ICD-10-CM

## 2019-01-02 LAB
BASOPHILS # BLD AUTO: 0.1 10E9/L (ref 0–0.2)
BASOPHILS NFR BLD AUTO: 0.9 %
DIFFERENTIAL METHOD BLD: NORMAL
EOSINOPHIL # BLD AUTO: 0.2 10E9/L (ref 0–0.7)
EOSINOPHIL NFR BLD AUTO: 2.7 %
ERYTHROCYTE [DISTWIDTH] IN BLOOD BY AUTOMATED COUNT: 13 % (ref 10–15)
HCT VFR BLD AUTO: 45 % (ref 40–53)
HGB BLD-MCNC: 15.3 G/DL (ref 13.3–17.7)
IMM GRANULOCYTES # BLD: 0 10E9/L (ref 0–0.4)
IMM GRANULOCYTES NFR BLD: 0.2 %
LYMPHOCYTES # BLD AUTO: 2.1 10E9/L (ref 0.8–5.3)
LYMPHOCYTES NFR BLD AUTO: 31 %
MCH RBC QN AUTO: 31.6 PG (ref 26.5–33)
MCHC RBC AUTO-ENTMCNC: 34 G/DL (ref 31.5–36.5)
MCV RBC AUTO: 93 FL (ref 78–100)
MONOCYTES # BLD AUTO: 0.6 10E9/L (ref 0–1.3)
MONOCYTES NFR BLD AUTO: 8.4 %
NEUTROPHILS # BLD AUTO: 3.8 10E9/L (ref 1.6–8.3)
NEUTROPHILS NFR BLD AUTO: 56.8 %
NRBC # BLD AUTO: 0 10*3/UL
NRBC BLD AUTO-RTO: 0 /100
PLATELET # BLD AUTO: 187 10E9/L (ref 150–450)
RBC # BLD AUTO: 4.84 10E12/L (ref 4.4–5.9)
WBC # BLD AUTO: 6.6 10E9/L (ref 4–11)

## 2019-01-02 PROCEDURE — 99214 OFFICE O/P EST MOD 30 MIN: CPT | Performed by: FAMILY MEDICINE

## 2019-01-02 PROCEDURE — G0463 HOSPITAL OUTPT CLINIC VISIT: HCPCS

## 2019-01-02 PROCEDURE — 85025 COMPLETE CBC W/AUTO DIFF WBC: CPT | Mod: ZL | Performed by: FAMILY MEDICINE

## 2019-01-02 PROCEDURE — 36415 COLL VENOUS BLD VENIPUNCTURE: CPT | Mod: ZL | Performed by: FAMILY MEDICINE

## 2019-01-02 RX ORDER — ASPIRIN 81 MG/1
81 TABLET ORAL DAILY
COMMUNITY

## 2019-01-02 RX ORDER — CEPHALEXIN 500 MG/1
500 CAPSULE ORAL 2 TIMES DAILY
Qty: 20 CAPSULE | Refills: 0 | Status: SHIPPED | OUTPATIENT
Start: 2019-01-02 | End: 2019-01-08

## 2019-01-02 RX ORDER — CIPROFLOXACIN 500 MG/1
500 TABLET, FILM COATED ORAL 2 TIMES DAILY
Qty: 20 TABLET | Refills: 0 | Status: SHIPPED | OUTPATIENT
Start: 2019-01-02 | End: 2019-01-08

## 2019-01-02 ASSESSMENT — PAIN SCALES - GENERAL: PAINLEVEL: NO PAIN (0)

## 2019-01-02 NOTE — PROGRESS NOTES
SUBJECTIVE:   Ugo Moss is a 73 year old male who presents to clinic today for the following health issues:      Derm Problem      Duration: 1 week    Description (location/character/radiation): quarter sized cyst on back    Intensity:  mild    History (similar episodes/previous evaluation): None    Precipitating or alleviating factors: None    Therapies tried and outcome: None       Pina's esophagus      Duration: Diagnosed on endoscopy    Description (location/character/radiation): biopsy    Intensity:  mild    Accompanying signs and symptoms: GERD    History (similar episodes/previous evaluation): None    Precipitating or alleviating factors: None    Therapies tried and outcome: proton pump inhibitor        Problem list and histories reviewed & adjusted, as indicated.  Additional history: as documented    Patient Active Problem List   Diagnosis     BPH (benign prostatic hyperplasia)     Gout, recurrent     History reviewed. No pertinent surgical history.    Social History     Tobacco Use     Smoking status: Never Smoker     Smokeless tobacco: Never Used   Substance Use Topics     Alcohol use: Yes     Comment: wine 3-4 times weekly     History reviewed. No pertinent family history.      Current Outpatient Medications   Medication Sig Dispense Refill     aspirin 81 MG EC tablet Take 81 mg by mouth daily       cephALEXin (KEFLEX) 500 MG capsule Take 1 capsule (500 mg) by mouth 2 times daily for 10 days 20 capsule 0     ciprofloxacin (CIPRO) 500 MG tablet Take 1 tablet (500 mg) by mouth 2 times daily for 10 days 20 tablet 0     esomeprazole (NEXIUM) 20 MG DR capsule Take 20 mg by mouth every morning (before breakfast) Take 30-60 minutes before eating.       Allergies   Allergen Reactions     Amoxicillin      Amoxicillin Trihydrate Other (See Comments)     Augmentin       Clavulanic Acid Potassium Other (See Comments)     Augmentin       BP Readings from Last 3 Encounters:   01/02/19 128/76   07/05/17  130/80   04/28/17 (!) 126/92    Wt Readings from Last 3 Encounters:   01/02/19 102.5 kg (226 lb)   07/05/17 97.5 kg (215 lb)   10/28/16 100.7 kg (222 lb)                    Reviewed and updated as needed this visit by clinical staff  Tobacco  Allergies  Meds  Problems  Med Hx  Surg Hx  Fam Hx       Reviewed and updated as needed this visit by Provider         ROS:  Constitutional, HEENT, cardiovascular, pulmonary, gi and gu systems are negative, except as otherwise noted.    OBJECTIVE:     /76 (BP Location: Right arm, Patient Position: Sitting, Cuff Size: Adult Regular)   Pulse 79   Temp 96.6  F (35.9  C) (Tympanic)   Resp 16   Wt 102.5 kg (226 lb)   SpO2 96%   BMI 32.43 kg/m    Body mass index is 32.43 kg/m .  Physical Exam   Constitutional: He is oriented to person, place, and time. He appears well-developed and well-nourished. No distress.   Neurological: He is alert and oriented to person, place, and time.   Skin: Skin is warm and dry.   There is a draining mildly tender sebaceous cyst noted in the middle upper back   Psychiatric: He has a normal mood and affect.         Diagnostic Test Results:  Results for orders placed or performed in visit on 01/02/19   CBC with platelets differential   Result Value Ref Range    WBC 6.6 4.0 - 11.0 10e9/L    RBC Count 4.84 4.4 - 5.9 10e12/L    Hemoglobin 15.3 13.3 - 17.7 g/dL    Hematocrit 45.0 40.0 - 53.0 %    MCV 93 78 - 100 fl    MCH 31.6 26.5 - 33.0 pg    MCHC 34.0 31.5 - 36.5 g/dL    RDW 13.0 10.0 - 15.0 %    Platelet Count 187 150 - 450 10e9/L    Diff Method Automated Method     % Neutrophils 56.8 %    % Lymphocytes 31.0 %    % Monocytes 8.4 %    % Eosinophils 2.7 %    % Basophils 0.9 %    % Immature Granulocytes 0.2 %    Nucleated RBCs 0 0 /100    Absolute Neutrophil 3.8 1.6 - 8.3 10e9/L    Absolute Lymphocytes 2.1 0.8 - 5.3 10e9/L    Absolute Monocytes 0.6 0.0 - 1.3 10e9/L    Absolute Eosinophils 0.2 0.0 - 0.7 10e9/L    Absolute Basophils 0.1 0.0 -  0.2 10e9/L    Abs Immature Granulocytes 0.0 0 - 0.4 10e9/L    Absolute Nucleated RBC 0.0        ASSESSMENT/PLAN:     Sebaceous cyst  Begin Keflex as written.  Warm packs.  - cephALEXin (KEFLEX) 500 MG capsule; Take 1 capsule (500 mg) by mouth 2 times daily for 10 days    Pina's esophagus without dysplasia  Reviewed.  - CBC with platelets differential    BPH (benign prostatic hyperplasia)  Cipro to be taken to place of winter vacation  - ciprofloxacin (CIPRO) 500 MG tablet; Take 1 tablet (500 mg) by mouth 2 times daily for 10 days      Mahad Henley MD  [facility] [place]

## 2019-01-02 NOTE — NURSING NOTE
"Chief Complaint   Patient presents with     Derm Problem       Initial /76 (BP Location: Right arm, Patient Position: Sitting, Cuff Size: Adult Regular)   Pulse 79   Temp 96.6  F (35.9  C) (Tympanic)   Resp 16   Wt 102.5 kg (226 lb)   SpO2 96%   BMI 32.43 kg/m   Estimated body mass index is 32.43 kg/m  as calculated from the following:    Height as of 7/5/17: 1.778 m (5' 10\").    Weight as of this encounter: 102.5 kg (226 lb).  Medication Reconciliation: complete    Sadia Dutta LPN  "

## 2019-01-04 PROBLEM — K22.70 BARRETT'S ESOPHAGUS WITHOUT DYSPLASIA: Status: ACTIVE | Noted: 2019-01-04

## 2019-01-08 ENCOUNTER — APPOINTMENT (OUTPATIENT)
Dept: LAB | Facility: OTHER | Age: 74
End: 2019-01-08
Attending: FAMILY MEDICINE

## 2019-01-08 ENCOUNTER — OFFICE VISIT (OUTPATIENT)
Dept: FAMILY MEDICINE | Facility: OTHER | Age: 74
End: 2019-01-08
Attending: FAMILY MEDICINE

## 2019-01-08 VITALS
BODY MASS INDEX: 32.88 KG/M2 | HEIGHT: 69 IN | DIASTOLIC BLOOD PRESSURE: 82 MMHG | SYSTOLIC BLOOD PRESSURE: 132 MMHG | HEART RATE: 66 BPM | WEIGHT: 222 LBS

## 2019-01-08 DIAGNOSIS — Z13.6 SCREENING, HEART DISEASE, ISCHEMIC: Primary | ICD-10-CM

## 2019-01-08 DIAGNOSIS — Z02.89 ENCOUNTER FOR FEDERAL AVIATION ADMINISTRATION (FAA) EXAMINATION: Primary | ICD-10-CM

## 2019-01-08 DIAGNOSIS — Z12.5 SCREENING FOR PROSTATE CANCER: Primary | ICD-10-CM

## 2019-01-08 LAB
ALBUMIN SERPL-MCNC: 3.9 G/DL (ref 3.4–5)
ALBUMIN UR-MCNC: NEGATIVE MG/DL
ALP SERPL-CCNC: 61 U/L (ref 40–150)
ALT SERPL W P-5'-P-CCNC: 30 U/L (ref 0–70)
ANION GAP SERPL CALCULATED.3IONS-SCNC: 6 MMOL/L (ref 3–14)
APPEARANCE UR: CLEAR
AST SERPL W P-5'-P-CCNC: 12 U/L (ref 0–45)
BACTERIA #/AREA URNS HPF: ABNORMAL /HPF
BILIRUB SERPL-MCNC: 0.6 MG/DL (ref 0.2–1.3)
BILIRUB UR QL STRIP: NEGATIVE
BUN SERPL-MCNC: 24 MG/DL (ref 7–30)
CALCIUM SERPL-MCNC: 8.6 MG/DL (ref 8.5–10.1)
CHLORIDE SERPL-SCNC: 106 MMOL/L (ref 94–109)
CHOLEST SERPL-MCNC: 134 MG/DL
CO2 SERPL-SCNC: 28 MMOL/L (ref 20–32)
COLOR UR AUTO: ABNORMAL
CREAT SERPL-MCNC: 1.02 MG/DL (ref 0.66–1.25)
GFR SERPL CREATININE-BSD FRML MDRD: 72 ML/MIN/{1.73_M2}
GLUCOSE SERPL-MCNC: 136 MG/DL (ref 70–99)
GLUCOSE UR STRIP-MCNC: NEGATIVE MG/DL
HDLC SERPL-MCNC: 43 MG/DL
HGB UR QL STRIP: NEGATIVE
KETONES UR STRIP-MCNC: NEGATIVE MG/DL
LDLC SERPL CALC-MCNC: 75 MG/DL
LEUKOCYTE ESTERASE UR QL STRIP: NEGATIVE
MUCOUS THREADS #/AREA URNS LPF: PRESENT /LPF
NITRATE UR QL: NEGATIVE
NONHDLC SERPL-MCNC: 91 MG/DL
PH UR STRIP: 5.5 PH (ref 4.7–8)
POTASSIUM SERPL-SCNC: 4 MMOL/L (ref 3.4–5.3)
PROT SERPL-MCNC: 7.6 G/DL (ref 6.8–8.8)
PSA SERPL-ACNC: 0.7 UG/L (ref 0–4)
RBC #/AREA URNS AUTO: 1 /HPF (ref 0–2)
SODIUM SERPL-SCNC: 140 MMOL/L (ref 133–144)
SOURCE: ABNORMAL
SP GR UR STRIP: 1.01 (ref 1–1.03)
TRIGL SERPL-MCNC: 81 MG/DL
UROBILINOGEN UR STRIP-MCNC: NORMAL MG/DL (ref 0–2)
WBC #/AREA URNS AUTO: <1 /HPF (ref 0–5)

## 2019-01-08 PROCEDURE — 36415 COLL VENOUS BLD VENIPUNCTURE: CPT | Mod: ZL

## 2019-01-08 PROCEDURE — G0103 PSA SCREENING: HCPCS | Mod: ZL

## 2019-01-08 PROCEDURE — 81001 URINALYSIS AUTO W/SCOPE: CPT | Performed by: FAMILY MEDICINE

## 2019-01-08 PROCEDURE — 80061 LIPID PANEL: CPT | Mod: ZL | Performed by: FAMILY MEDICINE

## 2019-01-08 PROCEDURE — 99499 UNLISTED E&M SERVICE: CPT | Performed by: FAMILY MEDICINE

## 2019-01-08 PROCEDURE — 80053 COMPREHEN METABOLIC PANEL: CPT | Mod: ZL | Performed by: FAMILY MEDICINE

## 2019-01-08 RX ORDER — CEPHALEXIN 500 MG/1
500 CAPSULE ORAL 2 TIMES DAILY
COMMUNITY
End: 2019-10-30

## 2019-01-08 ASSESSMENT — PAIN SCALES - GENERAL: PAINLEVEL: NO PAIN (0)

## 2019-01-08 ASSESSMENT — MIFFLIN-ST. JEOR: SCORE: 1734.43

## 2019-01-08 NOTE — PROGRESS NOTES
"SUBJECTIVE:  Ugo is a 73 year old male (1945) who is seen for [facility] Class 2 Medical Exam.   He offers no current complaints.  Identification is validated.      Current Outpatient Medications:      aspirin 81 MG EC tablet, Take 81 mg by mouth daily, Disp: , Rfl:      cephALEXin (KEFLEX) 500 MG capsule, Take 500 mg by mouth 2 times daily For 10 days, Disp: , Rfl:      esomeprazole (NEXIUM) 20 MG DR capsule, Take 20 mg by mouth every morning (before breakfast) Take 30-60 minutes before eating., Disp: , Rfl:     Allergies   Allergen Reactions     Amoxicillin      Amoxicillin Trihydrate Other (See Comments)     Augmentin       Clavulanic Acid Potassium Other (See Comments)     Augmentin             OBJECTIVE:  /82 (BP Location: Right arm, Patient Position: Sitting, Cuff Size: Adult Regular)   Pulse 66   Ht 1.74 m (5' 8.5\")   Wt 100.7 kg (222 lb)   BMI 33.26 kg/m      For details, please see scanned form.        ASSESSMENT/PLAN:    [facility] Class 2 Medical Exam       Class 2 Medical Certificate issued.  Valid for 1 years.       Form 0705 downloaded, reviewed, and scanned.        Mahad Henley MD    "

## 2019-01-08 NOTE — NURSING NOTE
"Chief Complaint   Patient presents with     FAA Physical       Initial /82 (BP Location: Right arm, Patient Position: Sitting, Cuff Size: Adult Regular)   Pulse 66   Ht 1.74 m (5' 8.5\")   Wt 100.7 kg (222 lb)   BMI 33.26 kg/m   Estimated body mass index is 33.26 kg/m  as calculated from the following:    Height as of this encounter: 1.74 m (5' 8.5\").    Weight as of this encounter: 100.7 kg (222 lb).  Medication Reconciliation: complete    Sadia Dutta LPN  "

## 2019-06-06 ENCOUNTER — RESULTS ONLY (OUTPATIENT)
Dept: LAB | Age: 74
End: 2019-06-06

## 2019-06-06 ENCOUNTER — TRANSFERRED RECORDS (OUTPATIENT)
Dept: HEALTH INFORMATION MANAGEMENT | Facility: CLINIC | Age: 74
End: 2019-06-06

## 2019-06-10 LAB — COPATH REPORT: NORMAL

## 2019-08-08 ENCOUNTER — TRANSFERRED RECORDS (OUTPATIENT)
Dept: HEALTH INFORMATION MANAGEMENT | Facility: CLINIC | Age: 74
End: 2019-08-08

## 2019-09-09 DIAGNOSIS — J02.9 ACUTE PHARYNGITIS, UNSPECIFIED ETIOLOGY: Primary | ICD-10-CM

## 2019-09-09 DIAGNOSIS — J02.9 ACUTE PHARYNGITIS: Primary | ICD-10-CM

## 2019-09-09 DIAGNOSIS — J02.9 ACUTE PHARYNGITIS: ICD-10-CM

## 2019-09-09 LAB
DEPRECATED S PYO AG THROAT QL EIA: NORMAL
SPECIMEN SOURCE: NORMAL

## 2019-09-09 PROCEDURE — 87880 STREP A ASSAY W/OPTIC: CPT | Mod: ZL | Performed by: FAMILY MEDICINE

## 2019-09-09 PROCEDURE — 87081 CULTURE SCREEN ONLY: CPT | Mod: ZL | Performed by: FAMILY MEDICINE

## 2019-09-11 LAB
BACTERIA SPEC CULT: NORMAL
SPECIMEN SOURCE: NORMAL

## 2019-09-25 ENCOUNTER — OFFICE VISIT (OUTPATIENT)
Dept: FAMILY MEDICINE | Facility: OTHER | Age: 74
End: 2019-09-25
Attending: FAMILY MEDICINE
Payer: MEDICARE

## 2019-09-25 VITALS
SYSTOLIC BLOOD PRESSURE: 120 MMHG | HEART RATE: 58 BPM | WEIGHT: 204.8 LBS | OXYGEN SATURATION: 96 % | DIASTOLIC BLOOD PRESSURE: 76 MMHG | TEMPERATURE: 97.2 F | HEIGHT: 69 IN | BODY MASS INDEX: 30.33 KG/M2

## 2019-09-25 DIAGNOSIS — Z12.5 SCREENING FOR PROSTATE CANCER: ICD-10-CM

## 2019-09-25 DIAGNOSIS — R68.89 OTHER GENERAL SYMPTOMS AND SIGNS: ICD-10-CM

## 2019-09-25 DIAGNOSIS — N42.9 DISORDER OF PROSTATE: ICD-10-CM

## 2019-09-25 DIAGNOSIS — K22.70 BARRETT'S ESOPHAGUS WITHOUT DYSPLASIA: ICD-10-CM

## 2019-09-25 DIAGNOSIS — R05.9 COUGH: Primary | ICD-10-CM

## 2019-09-25 DIAGNOSIS — Z13.6 ENCOUNTER FOR SCREENING FOR CARDIOVASCULAR DISORDERS: ICD-10-CM

## 2019-09-25 PROCEDURE — 99214 OFFICE O/P EST MOD 30 MIN: CPT | Performed by: FAMILY MEDICINE

## 2019-09-25 PROCEDURE — G0463 HOSPITAL OUTPT CLINIC VISIT: HCPCS

## 2019-09-25 RX ORDER — CALCIUM CARBONATE 500 MG/1
1 TABLET, CHEWABLE ORAL DAILY
COMMUNITY
End: 2019-11-05

## 2019-09-25 ASSESSMENT — PAIN SCALES - GENERAL: PAINLEVEL: EXTREME PAIN (8)

## 2019-09-25 ASSESSMENT — MIFFLIN-ST. JEOR: SCORE: 1651.41

## 2019-09-25 NOTE — NURSING NOTE
"Chief Complaint   Patient presents with     Gastrophageal Reflux       Initial /76 (BP Location: Right arm, Patient Position: Sitting, Cuff Size: Adult Regular)   Pulse 58   Temp 97.2  F (36.2  C) (Tympanic)   Ht 1.74 m (5' 8.5\")   Wt 92.9 kg (204 lb 12.8 oz)   SpO2 96%   BMI 30.69 kg/m   Estimated body mass index is 30.69 kg/m  as calculated from the following:    Height as of this encounter: 1.74 m (5' 8.5\").    Weight as of this encounter: 92.9 kg (204 lb 12.8 oz).  Medication Reconciliation: complete  Yahaira Cordova LPN  "

## 2019-09-25 NOTE — PROGRESS NOTES
Subjective     Ugo Moss is a 74 year old male who presents to clinic today for the following health issues:    HPI   GERD/Heartburn      Duration: Ongoing    Description (location/character/radiation): low central abdominal area    Intensity:  moderate    Accompanying signs and symptoms:  food getting stuck: no   nausea/vomiting/blood: no   abdominal pain: YES  black/tarry or bloody stools: no :    History (similar episodes/previous evaluation): None    Precipitating or alleviating factors:  worse with fatty foods, spicy foods, caffeinated drinks, alcohol and peppermint.  current NSAID/Aspirin use: YES    Therapies tried and outcome: Nexium and Zantac    Ugo has a history of chronic GERD and has had endoscopy showing Barrets esophagus and another which was negative.  He continues with chronic cough.  Ugo wishes to see Dr Maude HARDIN. This will be scheduled.     He is in need of screening labs    Patient Active Problem List   Diagnosis     BPH (benign prostatic hyperplasia)     Gout, recurrent     Pina's esophagus without dysplasia     No past surgical history on file.    Social History     Tobacco Use     Smoking status: Never Smoker     Smokeless tobacco: Never Used   Substance Use Topics     Alcohol use: Yes     Comment: wine 3-4 times weekly     No family history on file.      Current Outpatient Medications   Medication Sig Dispense Refill     ALPRAZolam (XANAX PO) Take 2 mg by mouth At Bedtime       aspirin 81 MG EC tablet Take 81 mg by mouth daily       calcium carbonate (TUMS) 500 MG chewable tablet Take 1 chew tab by mouth daily       cephALEXin (KEFLEX) 500 MG capsule Take 500 mg by mouth 2 times daily For 10 days       esomeprazole (NEXIUM) 20 MG DR capsule Take 20 mg by mouth every morning (before breakfast) Take 30-60 minutes before eating.       Allergies   Allergen Reactions     Amoxicillin      Amoxicillin Trihydrate Other (See Comments)     Augmentin       Clavulanic Acid Potassium Other  "(See Comments)     Augmentin       Recent Labs   Lab Test 01/08/19  0737 04/28/17  1158 10/28/16  1227 12/02/14  0927   LDL 75 113*  --  95   HDL 43 53  --  55   TRIG 81 129  --  106   ALT 30 44 38 44   CR 1.02 0.81 1.00 0.97   GFRESTIMATED 72 >90  Non  GFR Calc   74 77   GFRESTBLACK 84 >90   GFR Calc   89 >90   GFR Calc     POTASSIUM 4.0 4.2 4.4 4.0      BP Readings from Last 3 Encounters:   09/25/19 120/76   01/08/19 132/82   01/02/19 128/76    Wt Readings from Last 3 Encounters:   09/25/19 92.9 kg (204 lb 12.8 oz)   01/08/19 100.7 kg (222 lb)   01/02/19 102.5 kg (226 lb)                    Reviewed and updated as needed this visit by Provider         Review of Systems   ROS COMP: Constitutional, HEENT, cardiovascular, pulmonary, gi and gu systems are negative, except as otherwise noted.      Objective    /76 (BP Location: Right arm, Patient Position: Sitting, Cuff Size: Adult Regular)   Pulse 58   Temp 97.2  F (36.2  C) (Tympanic)   Ht 1.74 m (5' 8.5\")   Wt 92.9 kg (204 lb 12.8 oz)   SpO2 96%   BMI 30.69 kg/m    Body mass index is 30.69 kg/m .  Physical Exam  Vitals signs and nursing note reviewed.   Constitutional:       Appearance: He is well-developed.   Neurological:      Mental Status: He is alert and oriented to person, place, and time.   Psychiatric:         Mood and Affect: Mood normal.         Behavior: Behavior normal.         Thought Content: Thought content normal.        Other exam not repeated    Diagnostic Test Results:  Labs reviewed in Epic        Assessment & Plan     1. Cough  Suspect secondary to GERD.  CT chest scheduled to rule out other cause.  Follow up as arranged.  - CT Chest w Contrast; Future    2. Screening for prostate cancer  PSA drawn  - Prostate spec antigen screen; Future    3. Pina's esophagus without dysplasia  Records form [place] reviewed.  Biopsy results reviewed.  Appointment with GI scheduled for evaluation " "and recommendations for further management.     4. Encounter for screening for cardiovascular disorders  Labs to be drawn.  - CBC with platelets differential; Future  - Comprehensive metabolic panel; Future  - Lipid Profile; Future  - TSH with free T4 reflex; Future  - UA with Microscopic reflex to Culture; Future    5. Disorder of prostate   As above  - CBC with platelets differential; Future    6. Other general symptoms and signs   As above  - TSH with free T4 reflex; Future     BMI:   Estimated body mass index is 30.69 kg/m  as calculated from the following:    Height as of this encounter: 1.74 m (5' 8.5\").    Weight as of this encounter: 92.9 kg (204 lb 12.8 oz).   Weight management plan: Discussed healthy diet and exercise guidelines        See Patient Instructions    No follow-ups on file.    Mahad Henley MD  [facility] [place]    "

## 2019-09-26 ENCOUNTER — TELEPHONE (OUTPATIENT)
Dept: FAMILY MEDICINE | Facility: OTHER | Age: 74
End: 2019-09-26

## 2019-09-26 DIAGNOSIS — K22.719 BARRETT'S ESOPHAGUS WITH DYSPLASIA: Primary | ICD-10-CM

## 2019-09-26 NOTE — TELEPHONE ENCOUNTER
Patient returned call. Patient stated that he needs this done ASAP as they have an opening for the endoscopy on 10/3/2019 and wants this done then. Please advise.

## 2019-09-26 NOTE — TELEPHONE ENCOUNTER
Pt states provider was going to place a referral to Gastro at [facility] for an upper endoscopy. No referral is seen in chart. They are requesting a verbal order to schedule pt and then order can be faxed afterward. Please return their call for  Verbal referral at [phone] option 1 for scheduling. They will be in contact with pt.

## 2019-09-30 ENCOUNTER — TELEPHONE (OUTPATIENT)
Dept: FAMILY MEDICINE | Facility: OTHER | Age: 74
End: 2019-09-30

## 2019-09-30 ENCOUNTER — HOSPITAL ENCOUNTER (OUTPATIENT)
Dept: CT IMAGING | Facility: HOSPITAL | Age: 74
Discharge: HOME | End: 2019-09-30
Attending: FAMILY MEDICINE | Admitting: FAMILY MEDICINE
Payer: MEDICARE

## 2019-09-30 DIAGNOSIS — R82.90 ABNORMAL URINE FINDINGS: ICD-10-CM

## 2019-09-30 DIAGNOSIS — Z11.59 ENCOUNTER FOR HEPATITIS C SCREENING TEST FOR LOW RISK PATIENT: Primary | ICD-10-CM

## 2019-09-30 DIAGNOSIS — Z13.6 ENCOUNTER FOR SCREENING FOR CARDIOVASCULAR DISORDERS: ICD-10-CM

## 2019-09-30 DIAGNOSIS — R05.9 COUGH: ICD-10-CM

## 2019-09-30 DIAGNOSIS — N42.9 DISORDER OF PROSTATE: ICD-10-CM

## 2019-09-30 DIAGNOSIS — R10.9 ABDOMINAL PAIN: ICD-10-CM

## 2019-09-30 DIAGNOSIS — R68.89 OTHER GENERAL SYMPTOMS AND SIGNS: ICD-10-CM

## 2019-09-30 DIAGNOSIS — K22.70 BARRETT'S ESOPHAGUS WITHOUT DYSPLASIA: Primary | ICD-10-CM

## 2019-09-30 DIAGNOSIS — K22.70 BARRETT'S ESOPHAGUS WITHOUT DYSPLASIA: ICD-10-CM

## 2019-09-30 DIAGNOSIS — Z12.5 SCREENING FOR PROSTATE CANCER: ICD-10-CM

## 2019-09-30 LAB
ALBUMIN SERPL-MCNC: 4 G/DL (ref 3.4–5)
ALBUMIN UR-MCNC: NEGATIVE MG/DL
ALP SERPL-CCNC: 61 U/L (ref 40–150)
ALT SERPL W P-5'-P-CCNC: 36 U/L (ref 0–70)
ANION GAP SERPL CALCULATED.3IONS-SCNC: 5 MMOL/L (ref 3–14)
APPEARANCE UR: CLEAR
AST SERPL W P-5'-P-CCNC: 13 U/L (ref 0–45)
BACTERIA #/AREA URNS HPF: ABNORMAL /HPF
BASOPHILS # BLD AUTO: 0 10E9/L (ref 0–0.2)
BASOPHILS NFR BLD AUTO: 0.6 %
BILIRUB SERPL-MCNC: 0.8 MG/DL (ref 0.2–1.3)
BILIRUB UR QL STRIP: NEGATIVE
BUN SERPL-MCNC: 21 MG/DL (ref 7–30)
CALCIUM SERPL-MCNC: 8.7 MG/DL (ref 8.5–10.1)
CHLORIDE SERPL-SCNC: 105 MMOL/L (ref 94–109)
CHOLEST SERPL-MCNC: 168 MG/DL
CO2 SERPL-SCNC: 28 MMOL/L (ref 20–32)
COLOR UR AUTO: ABNORMAL
CREAT SERPL-MCNC: 0.92 MG/DL (ref 0.66–1.25)
DIFFERENTIAL METHOD BLD: NORMAL
EOSINOPHIL # BLD AUTO: 0.2 10E9/L (ref 0–0.7)
EOSINOPHIL NFR BLD AUTO: 2.7 %
ERYTHROCYTE [DISTWIDTH] IN BLOOD BY AUTOMATED COUNT: 13.5 % (ref 10–15)
GFR SERPL CREATININE-BSD FRML MDRD: 82 ML/MIN/{1.73_M2}
GLUCOSE SERPL-MCNC: 110 MG/DL (ref 70–99)
GLUCOSE UR STRIP-MCNC: NEGATIVE MG/DL
HCT VFR BLD AUTO: 47 % (ref 40–53)
HDLC SERPL-MCNC: 64 MG/DL
HGB BLD-MCNC: 15.6 G/DL (ref 13.3–17.7)
HGB UR QL STRIP: NEGATIVE
IMM GRANULOCYTES # BLD: 0 10E9/L (ref 0–0.4)
IMM GRANULOCYTES NFR BLD: 0.3 %
KETONES UR STRIP-MCNC: NEGATIVE MG/DL
LDLC SERPL CALC-MCNC: 88 MG/DL
LEUKOCYTE ESTERASE UR QL STRIP: ABNORMAL
LYMPHOCYTES # BLD AUTO: 2.1 10E9/L (ref 0.8–5.3)
LYMPHOCYTES NFR BLD AUTO: 33.2 %
MCH RBC QN AUTO: 31.5 PG (ref 26.5–33)
MCHC RBC AUTO-ENTMCNC: 33.2 G/DL (ref 31.5–36.5)
MCV RBC AUTO: 95 FL (ref 78–100)
MONOCYTES # BLD AUTO: 0.5 10E9/L (ref 0–1.3)
MONOCYTES NFR BLD AUTO: 7.8 %
MUCOUS THREADS #/AREA URNS LPF: PRESENT /LPF
NEUTROPHILS # BLD AUTO: 3.5 10E9/L (ref 1.6–8.3)
NEUTROPHILS NFR BLD AUTO: 55.4 %
NITRATE UR QL: NEGATIVE
NONHDLC SERPL-MCNC: 104 MG/DL
NRBC # BLD AUTO: 0 10*3/UL
NRBC BLD AUTO-RTO: 0 /100
PH UR STRIP: 6.5 PH (ref 4.7–8)
PLATELET # BLD AUTO: 166 10E9/L (ref 150–450)
POTASSIUM SERPL-SCNC: 4 MMOL/L (ref 3.4–5.3)
PROT SERPL-MCNC: 7.9 G/DL (ref 6.8–8.8)
PSA SERPL-ACNC: 0.8 UG/L (ref 0–4)
RBC # BLD AUTO: 4.96 10E12/L (ref 4.4–5.9)
RBC #/AREA URNS AUTO: 2 /HPF (ref 0–2)
SODIUM SERPL-SCNC: 138 MMOL/L (ref 133–144)
SOURCE: ABNORMAL
SP GR UR STRIP: 1.01 (ref 1–1.03)
TRIGL SERPL-MCNC: 79 MG/DL
TSH SERPL DL<=0.005 MIU/L-ACNC: 1.48 MU/L (ref 0.4–4)
UROBILINOGEN UR STRIP-MCNC: NORMAL MG/DL (ref 0–2)
WBC # BLD AUTO: 6.3 10E9/L (ref 4–11)
WBC #/AREA URNS AUTO: 108 /HPF (ref 0–5)

## 2019-09-30 PROCEDURE — 87086 URINE CULTURE/COLONY COUNT: CPT | Mod: ZL | Performed by: FAMILY MEDICINE

## 2019-09-30 PROCEDURE — 74177 CT ABD & PELVIS W/CONTRAST: CPT | Mod: TC

## 2019-09-30 PROCEDURE — 85025 COMPLETE CBC W/AUTO DIFF WBC: CPT | Mod: ZL | Performed by: FAMILY MEDICINE

## 2019-09-30 PROCEDURE — G0103 PSA SCREENING: HCPCS | Mod: ZL | Performed by: FAMILY MEDICINE

## 2019-09-30 PROCEDURE — 84443 ASSAY THYROID STIM HORMONE: CPT | Mod: ZL | Performed by: FAMILY MEDICINE

## 2019-09-30 PROCEDURE — 81001 URINALYSIS AUTO W/SCOPE: CPT | Mod: ZL | Performed by: FAMILY MEDICINE

## 2019-09-30 PROCEDURE — 80053 COMPREHEN METABOLIC PANEL: CPT | Mod: ZL | Performed by: FAMILY MEDICINE

## 2019-09-30 PROCEDURE — 36415 COLL VENOUS BLD VENIPUNCTURE: CPT | Mod: ZL | Performed by: FAMILY MEDICINE

## 2019-09-30 PROCEDURE — 25500064 ZZH RX 255 OP 636: Performed by: RADIOLOGY

## 2019-09-30 PROCEDURE — G0472 HEP C SCREEN HIGH RISK/OTHER: HCPCS | Mod: ZL | Performed by: FAMILY MEDICINE

## 2019-09-30 PROCEDURE — 80061 LIPID PANEL: CPT | Mod: ZL,GZ | Performed by: FAMILY MEDICINE

## 2019-09-30 PROCEDURE — 84153 ASSAY OF PSA TOTAL: CPT | Mod: ZL | Performed by: FAMILY MEDICINE

## 2019-09-30 PROCEDURE — 71260 CT THORAX DX C+: CPT | Mod: TC

## 2019-09-30 RX ADMIN — DIATRIZOATE MEGLUMINE AND DIATRIZOATE SODIUM 30 ML: 660; 100 SOLUTION ORAL; RECTAL at 15:55

## 2019-09-30 RX ADMIN — IOHEXOL 100 ML: 300 INJECTION, SOLUTION INTRAVENOUS at 15:55

## 2019-10-02 LAB
BACTERIA SPEC CULT: NO GROWTH
HCV AB SERPL QL IA: NONREACTIVE
SPECIMEN SOURCE: NORMAL

## 2019-10-07 ENCOUNTER — TELEPHONE (OUTPATIENT)
Dept: FAMILY MEDICINE | Facility: OTHER | Age: 74
End: 2019-10-07

## 2019-10-07 NOTE — TELEPHONE ENCOUNTER
Apologize for no one getting back to him on this since study on 9/30    CT scan normal except for some chronic changes dating back several years.  No  Pathology found that would indicate why having sx. Follow up with DR BACK on further management

## 2019-10-07 NOTE — TELEPHONE ENCOUNTER
Patient calling has not been notified of CT results that he had done on 9/30 ordered by LESTER Skaggs- can you review results?

## 2019-10-30 ENCOUNTER — OFFICE VISIT (OUTPATIENT)
Dept: UROLOGY | Facility: OTHER | Age: 74
End: 2019-10-30
Attending: FAMILY MEDICINE
Payer: MEDICARE

## 2019-10-30 VITALS
TEMPERATURE: 96.9 F | SYSTOLIC BLOOD PRESSURE: 132 MMHG | RESPIRATION RATE: 16 BRPM | DIASTOLIC BLOOD PRESSURE: 68 MMHG | OXYGEN SATURATION: 97 % | HEART RATE: 75 BPM

## 2019-10-30 DIAGNOSIS — R33.9 INCOMPLETE BLADDER EMPTYING: ICD-10-CM

## 2019-10-30 DIAGNOSIS — Z23 NEED FOR PROPHYLACTIC VACCINATION AND INOCULATION AGAINST INFLUENZA: ICD-10-CM

## 2019-10-30 DIAGNOSIS — N30.00 ACUTE CYSTITIS WITHOUT HEMATURIA: Primary | ICD-10-CM

## 2019-10-30 PROCEDURE — G0463 HOSPITAL OUTPT CLINIC VISIT: HCPCS | Mod: 25

## 2019-10-30 PROCEDURE — 99204 OFFICE O/P NEW MOD 45 MIN: CPT | Performed by: UROLOGY

## 2019-10-30 PROCEDURE — 90662 IIV NO PRSV INCREASED AG IM: CPT

## 2019-10-30 PROCEDURE — 51798 US URINE CAPACITY MEASURE: CPT

## 2019-10-30 PROCEDURE — G0463 HOSPITAL OUTPT CLINIC VISIT: HCPCS

## 2019-10-30 PROCEDURE — G0008 ADMIN INFLUENZA VIRUS VAC: HCPCS | Performed by: UROLOGY

## 2019-10-30 RX ORDER — TAMSULOSIN HYDROCHLORIDE 0.4 MG/1
0.4 CAPSULE ORAL DAILY
Qty: 30 CAPSULE | Refills: 11 | Status: SHIPPED | OUTPATIENT
Start: 2019-10-30 | End: 2020-10-05

## 2019-10-30 ASSESSMENT — ENCOUNTER SYMPTOMS
FLANK PAIN: 1
BACK PAIN: 1

## 2019-10-30 ASSESSMENT — PAIN SCALES - GENERAL: PAINLEVEL: NO PAIN (0)

## 2019-10-30 NOTE — PROGRESS NOTES
History     Chief Complaint:    Consult (Per Lory); Benign Prostatic Hypertrophy; Flu Shot; and Imm/Inj (Flu Shot)      HPI   Ugo Moss is a 74 year old male who presents with a history of urinary tract infections questionable  prostatitis.  10 years ago the patient had a similar issue he saw Dr. Rothman he could not void and he was told he had an infection of the prostate and to the best that he recalls it took a long time to resolve.  He is done well since that time but says every couple of years I get a urinary tract infection.  Recently he was on a trip to [place] to visit lady friend and he developed dysuria and bladder pressure.  I do not believe he was seen but got a prescription for Cipro 500 p.o. twice daily and has now taken that for about 7 days and feeling some relief.  He did add some Pyridium recently and then he started even feeling better yet.  Again the patient did not have a urine culture done in [place].  He did have a urinalysis done the end of September 2019 which showed some pyuria but no significant bacteriuria and the culture was negative for growth.  When he is feeling well he typically voids every 2-3 hours, he gets up 1-2 times at night and says he has a good flow.  He and he does have some strange area.    To note he had a PSA done January 2019 it was 0.7 and the PSA that was done in 9 of 19 was 0.8.  He does think that at one point he was on Flomax 10 years ago and it did seem to help.    Allergies:    Allergies   Allergen Reactions     Amoxicillin      Amoxicillin Trihydrate Other (See Comments)     Augmentin       Clavulanic Acid Potassium Other (See Comments)     Augmentin          Medications:      ALPRAZolam (XANAX PO)  aspirin 81 MG EC tablet  calcium carbonate (TUMS) 500 MG chewable tablet  esomeprazole (NEXIUM) 20 MG DR capsule  tamsulosin (FLOMAX) 0.4 MG capsule        Problem List:      Patient Active Problem List    Diagnosis Date Noted     Screening for prostate  cancer 2019     Priority: Medium     Pina's esophagus without dysplasia 2019     Priority: Medium     Gout, recurrent 2017     Priority: Medium     BPH (benign prostatic hyperplasia) 10/28/2016     Priority: Medium        Past Medical History:      Past Medical History:   Diagnosis Date     BPH 2011     Erectile Dysfunction 2011       Past Surgical History:      Past Surgical History:   Procedure Laterality Date     ORTHOPEDIC SURGERY Left     Knee mensical repair     VASECTOMY         Family History:      Family History   Problem Relation Age of Onset     Diabetes Mother      Heart Disease Mother      Stomach Cancer Father      Diabetes Brother      Prostate Cancer Brother      Family history is significant for prostate cancer.  He had a grandfather that  of prostate cancer, 2 brothers that have prostate cancer.  One was diagnosed in his 50s and had his prostate removed.  And one brother that was diagnosed in his 70s and had radiation.  Both are living.  Social History:    Marital Status:   [2]     Patient is a non-smoker and he was a  for his occupation.  Social History     Tobacco Use     Smoking status: Never Smoker     Smokeless tobacco: Never Used   Substance Use Topics     Alcohol use: Yes     Comment: wine 3-4 times weekly     Drug use: No        Review of Systems   Genitourinary: Positive for flank pain.   Musculoskeletal: Positive for back pain.   All other systems reviewed and are negative.        Physical Exam   Vitals:  /68   Pulse 75   Temp 96.9  F (36.1  C) (Tympanic)   Resp 16   SpO2 97%       Physical Exam  Constitutional:       Appearance: Normal appearance. He is normal weight.   Neck:      Musculoskeletal: Normal range of motion and neck supple.   Cardiovascular:      Rate and Rhythm: Normal rate and regular rhythm.      Heart sounds: Normal heart sounds.   Pulmonary:      Effort: Pulmonary effort is normal. No respiratory distress.       Breath sounds: Normal breath sounds. No wheezing.   Abdominal:      General: Abdomen is flat. Bowel sounds are normal.      Palpations: Abdomen is soft. There is mass.      Comments: Bladder is percussed and is quite distended.  Void residual was 648 mL.   Genitourinary:     Penis: Normal and circumcised.       Scrotum/Testes: Normal.         Right: Mass or tenderness not present.         Left: Mass or tenderness not present.      Epididymis:      Right: Normal.      Left: Normal.      Prostate: Normal. Not enlarged, not tender and no nodules present.      Rectum: Normal. No mass.      Comments: Prostate exam normal external rectal area normal rectal tone prostates about 25 to 30 g does not feel terribly enlarged and is soft without any concerning nodularity.  Lymphadenopathy:      Cervical: No cervical adenopathy.   Neurological:      Mental Status: He is alert.       Impression: Complete bladder emptying    Plan   Plan: I am not concerned convinced that this is prostatitis or recurrent infections.  Patient has pyuria with no significant bacteriuria.  We do not really know what happened in [place] is no culture was ever done.  Patient's CT scan which was done a month ago shows a very distended bladder with possible diverticulum and I suspect he has had incomplete emptying for some time.  At this point I would like to proceed with cystoscopy to rule out any urethral stricture or significant prostate obstruction and to evaluate the bladder further.  I am starting him on tamsulosin 1 p.o. nightly I did warn him of the common side effects dizziness nasal stuffiness nausea he understands and wishes to proceed follow-up will be in 1 week for cystoscopy and we will do a urinalysis prior to that      Return in about 1 week (around 11/6/2019), or cystoscopy.    Maritza Victoria MD  [facility]

## 2019-10-30 NOTE — NURSING NOTE
"Chief Complaint   Patient presents with     Consult     Per Lory     Benign Prostatic Hypertrophy       Initial /68   Pulse 75   Temp 96.9  F (36.1  C) (Tympanic)   Resp 16   SpO2 97%  Estimated body mass index is 30.69 kg/m  as calculated from the following:    Height as of 9/25/19: 1.74 m (5' 8.5\").    Weight as of 9/25/19: 92.9 kg (204 lb 12.8 oz).  Medication Reconciliation: complete   Review of Systems:    Weight loss:    No     Recent fever/chills:  No   Night sweats:   No  Current skin rash:  No   Recent hair loss:  No  Heat intolerance:  No   Cold intolerance:  No  Chest pain:   No   Palpitations:   No  Shortness of breath:  No   Wheezing:   No  Constipation:    No   Diarrhea:   No   Nausea:   No   Vomiting:   No   Kidney/side pain:  yes   Back pain:   yes  Frequent headaches:  No   Dizziness:     No  Leg swelling:   No   Calf pain:    No    Parents, brothers or sisters with history of kidney cancer:   No  Parents, brothers or sisters with history of bladder cancer: No    Yessica Storm LPN    "

## 2019-11-05 ENCOUNTER — OFFICE VISIT (OUTPATIENT)
Dept: UROLOGY | Facility: OTHER | Age: 74
End: 2019-11-05
Attending: UROLOGY
Payer: MEDICARE

## 2019-11-05 VITALS
HEART RATE: 81 BPM | SYSTOLIC BLOOD PRESSURE: 112 MMHG | TEMPERATURE: 97.3 F | RESPIRATION RATE: 16 BRPM | OXYGEN SATURATION: 98 % | DIASTOLIC BLOOD PRESSURE: 64 MMHG

## 2019-11-05 DIAGNOSIS — N30.00 ACUTE CYSTITIS WITHOUT HEMATURIA: Primary | ICD-10-CM

## 2019-11-05 DIAGNOSIS — R33.9 INCOMPLETE BLADDER EMPTYING: ICD-10-CM

## 2019-11-05 DIAGNOSIS — N41.0 ACUTE PROSTATITIS: ICD-10-CM

## 2019-11-05 PROCEDURE — 51798 US URINE CAPACITY MEASURE: CPT

## 2019-11-05 PROCEDURE — G0463 HOSPITAL OUTPT CLINIC VISIT: HCPCS | Mod: 25

## 2019-11-05 PROCEDURE — 99214 OFFICE O/P EST MOD 30 MIN: CPT | Performed by: UROLOGY

## 2019-11-05 PROCEDURE — 87086 URINE CULTURE/COLONY COUNT: CPT | Mod: ZL | Performed by: UROLOGY

## 2019-11-05 RX ORDER — CIPROFLOXACIN 500 MG/1
500 TABLET, FILM COATED ORAL 2 TIMES DAILY
Qty: 28 TABLET | Refills: 0 | Status: SHIPPED | OUTPATIENT
Start: 2019-11-05 | End: 2021-10-22

## 2019-11-05 ASSESSMENT — PAIN SCALES - GENERAL: PAINLEVEL: NO PAIN (0)

## 2019-11-05 NOTE — PROGRESS NOTES
Ugo comes into the office today for further evaluation of his voiding symptoms.  I initially saw Ugo on 1030 of 19 at which time he came in complaining that he had some urinating symptoms while on a trip to [place].  He got a prescription for Cipro which he took for a week and when I saw him his symptoms had essentially resolved.  He had not undergone a urine culture when he was in [place] and I do not think even a urinalysis was done but he just was able to get this prescription.  He returns today telling me that a few days after he finished his prescription his symptoms started again with dysuria.  He again took an antibiotic which she had at home Keflex 500 mg and he took that twice a day Friday Saturday Sunday Monday and Tuesday and he says currently he is not having any burning.  He I did start him on Flomax and he felt that that was helping he says his stream is much better.  When I saw him in the office his postvoid residual was in the 600 range and today it is 418 and that was after a second void.  It is clear that he still is not emptying his bladder however his symptoms are better.    It is still very difficult for me to know whether I am dealing with an acute prostatitis that has not resolved has no culture was ever done initially.  I did tell Ugo that we could treat him for 28 days for acute prostatitis and then reassess.  He is not wishing to undergo any cystoscopic evaluation at this time.  I did go ahead and send a prescription for ciprofloxacin 500 mg p.o. twice daily for another 2 weeks.  I will see him back in December when he returns from [place].  If he continues to have the symptoms of dysuria and incomplete emptying despite a full month of treatment with antibiotics then further evaluation with cystoscopy I have believe is necessary.  He expresses understanding.  25 minutes were spent all in counseling.  Prescription sent to the pharmacy

## 2019-11-05 NOTE — NURSING NOTE
"Chief Complaint   Patient presents with     Cystoscopy       Initial /64   Pulse 81   Temp 97.3  F (36.3  C) (Tympanic)   Resp 16   SpO2 98%  Estimated body mass index is 30.69 kg/m  as calculated from the following:    Height as of 9/25/19: 1.74 m (5' 8.5\").    Weight as of 9/25/19: 92.9 kg (204 lb 12.8 oz).  Medication Reconciliation: complete   Review of Systems:    Weight loss:    No     Recent fever/chills:  No   Night sweats:   No  Current skin rash:  No   Recent hair loss:  No  Heat intolerance:  No   Cold intolerance:  No  Chest pain:   No   Palpitations:   No  Shortness of breath:  No   Wheezing:   No  Constipation:    No   Diarrhea:   No   Nausea:   No   Vomiting:   No   Kidney/side pain:  No   Back pain:   No  Frequent headaches:  No   Dizziness:     No  Leg swelling:   No   Calf pain:    No    Parents, brothers or sisters with history of kidney cancer:   No  Parents, brothers or sisters with history of bladder cancer: No    Yessica Storm LPN    "

## 2019-11-07 LAB
BACTERIA SPEC CULT: NO GROWTH
SPECIMEN SOURCE: NORMAL

## 2020-03-02 ENCOUNTER — HEALTH MAINTENANCE LETTER (OUTPATIENT)
Age: 75
End: 2020-03-02

## 2020-04-07 DIAGNOSIS — R33.9 INCOMPLETE BLADDER EMPTYING: ICD-10-CM

## 2020-04-07 RX ORDER — TAMSULOSIN HYDROCHLORIDE 0.4 MG/1
0.4 CAPSULE ORAL DAILY
Qty: 30 CAPSULE | Refills: 11 | OUTPATIENT
Start: 2020-04-07

## 2020-04-07 NOTE — TELEPHONE ENCOUNTER
tamsulosin  Last Written Prescription Date:  10/30/19  Last Fill Qty:  30, # Refills:  11  Last Office Visit:  11/5/19    Denying current refill request as refills are available.

## 2020-07-05 DIAGNOSIS — Z20.822 EXPOSURE TO COVID-19 VIRUS: Primary | ICD-10-CM

## 2020-09-09 DIAGNOSIS — Z20.822 EXPOSURE TO 2019 NOVEL CORONAVIRUS: Primary | ICD-10-CM

## 2020-10-05 ENCOUNTER — MYC REFILL (OUTPATIENT)
Dept: UROLOGY | Facility: OTHER | Age: 75
End: 2020-10-05

## 2020-10-05 DIAGNOSIS — R33.9 INCOMPLETE BLADDER EMPTYING: ICD-10-CM

## 2020-10-06 NOTE — TELEPHONE ENCOUNTER
Flomax  Last Written Prescription Date: 10/30/19  Last Fill Quantity: 30 # of Refills: 11  Last Office Visit: 9/25/19

## 2020-10-07 ENCOUNTER — MYC REFILL (OUTPATIENT)
Dept: UROLOGY | Facility: OTHER | Age: 75
End: 2020-10-07

## 2020-10-07 DIAGNOSIS — Z13.6 SCREENING, HEART DISEASE, ISCHEMIC: ICD-10-CM

## 2020-10-07 DIAGNOSIS — R19.7 DIARRHEA, UNSPECIFIED TYPE: ICD-10-CM

## 2020-10-07 DIAGNOSIS — Z12.5 SCREENING FOR PROSTATE CANCER: ICD-10-CM

## 2020-10-07 DIAGNOSIS — Z20.822 EXPOSURE TO 2019 NOVEL CORONAVIRUS: Primary | ICD-10-CM

## 2020-10-07 DIAGNOSIS — R33.9 INCOMPLETE BLADDER EMPTYING: ICD-10-CM

## 2020-10-07 DIAGNOSIS — Z20.822 EXPOSURE TO 2019 NOVEL CORONAVIRUS: ICD-10-CM

## 2020-10-07 LAB
ALBUMIN SERPL-MCNC: 3.9 G/DL (ref 3.4–5)
ALBUMIN UR-MCNC: NEGATIVE MG/DL
ALP SERPL-CCNC: 74 U/L (ref 40–150)
ALT SERPL W P-5'-P-CCNC: 45 U/L (ref 0–70)
ANION GAP SERPL CALCULATED.3IONS-SCNC: 3 MMOL/L (ref 3–14)
APPEARANCE UR: CLEAR
AST SERPL W P-5'-P-CCNC: 22 U/L (ref 0–45)
BASOPHILS # BLD AUTO: 0.1 10E9/L (ref 0–0.2)
BASOPHILS NFR BLD AUTO: 0.9 %
BILIRUB SERPL-MCNC: 0.5 MG/DL (ref 0.2–1.3)
BILIRUB UR QL STRIP: NEGATIVE
BUN SERPL-MCNC: 16 MG/DL (ref 7–30)
CALCIUM SERPL-MCNC: 8.4 MG/DL (ref 8.5–10.1)
CHLORIDE SERPL-SCNC: 109 MMOL/L (ref 94–109)
CHOLEST SERPL-MCNC: 158 MG/DL
CO2 SERPL-SCNC: 27 MMOL/L (ref 20–32)
COLOR UR AUTO: ABNORMAL
CREAT SERPL-MCNC: 0.87 MG/DL (ref 0.66–1.25)
DIFFERENTIAL METHOD BLD: NORMAL
EOSINOPHIL # BLD AUTO: 0.2 10E9/L (ref 0–0.7)
EOSINOPHIL NFR BLD AUTO: 4 %
ERYTHROCYTE [DISTWIDTH] IN BLOOD BY AUTOMATED COUNT: 12.6 % (ref 10–15)
GFR SERPL CREATININE-BSD FRML MDRD: 84 ML/MIN/{1.73_M2}
GLUCOSE SERPL-MCNC: 137 MG/DL (ref 70–99)
GLUCOSE UR STRIP-MCNC: NORMAL MG/DL
HCT VFR BLD AUTO: 45.1 % (ref 40–53)
HDLC SERPL-MCNC: 48 MG/DL
HGB BLD-MCNC: 15.4 G/DL (ref 13.3–17.7)
HGB UR QL STRIP: NEGATIVE
IMM GRANULOCYTES # BLD: 0 10E9/L (ref 0–0.4)
IMM GRANULOCYTES NFR BLD: 0.4 %
KETONES UR STRIP-MCNC: NEGATIVE MG/DL
LDLC SERPL CALC-MCNC: 86 MG/DL
LEUKOCYTE ESTERASE UR QL STRIP: NEGATIVE
LYMPHOCYTES # BLD AUTO: 2.3 10E9/L (ref 0.8–5.3)
LYMPHOCYTES NFR BLD AUTO: 41 %
MCH RBC QN AUTO: 31.3 PG (ref 26.5–33)
MCHC RBC AUTO-ENTMCNC: 34.1 G/DL (ref 31.5–36.5)
MCV RBC AUTO: 92 FL (ref 78–100)
MONOCYTES # BLD AUTO: 0.4 10E9/L (ref 0–1.3)
MONOCYTES NFR BLD AUTO: 7.6 %
MUCOUS THREADS #/AREA URNS LPF: PRESENT /LPF
NEUTROPHILS # BLD AUTO: 2.6 10E9/L (ref 1.6–8.3)
NEUTROPHILS NFR BLD AUTO: 46.1 %
NITRATE UR QL: NEGATIVE
NONHDLC SERPL-MCNC: 110 MG/DL
NRBC # BLD AUTO: 0 10*3/UL
NRBC BLD AUTO-RTO: 0 /100
PH UR STRIP: 5 PH (ref 4.7–8)
PLATELET # BLD AUTO: 162 10E9/L (ref 150–450)
POTASSIUM SERPL-SCNC: 4.1 MMOL/L (ref 3.4–5.3)
PROT SERPL-MCNC: 7.7 G/DL (ref 6.8–8.8)
PSA SERPL-ACNC: 0.69 UG/L (ref 0–4)
RBC # BLD AUTO: 4.92 10E12/L (ref 4.4–5.9)
RBC #/AREA URNS AUTO: 1 /HPF (ref 0–2)
SODIUM SERPL-SCNC: 139 MMOL/L (ref 133–144)
SOURCE: ABNORMAL
SP GR UR STRIP: 1.02 (ref 1–1.03)
TRIGL SERPL-MCNC: 119 MG/DL
TSH SERPL DL<=0.005 MIU/L-ACNC: 1.88 MU/L (ref 0.4–4)
UROBILINOGEN UR STRIP-MCNC: NORMAL MG/DL (ref 0–2)
WBC # BLD AUTO: 5.6 10E9/L (ref 4–11)
WBC #/AREA URNS AUTO: 1 /HPF (ref 0–5)

## 2020-10-07 PROCEDURE — 80061 LIPID PANEL: CPT | Mod: ZL | Performed by: FAMILY MEDICINE

## 2020-10-07 PROCEDURE — 87899 AGENT NOS ASSAY W/OPTIC: CPT | Mod: ZL | Performed by: FAMILY MEDICINE

## 2020-10-07 PROCEDURE — 81001 URINALYSIS AUTO W/SCOPE: CPT | Mod: ZL | Performed by: FAMILY MEDICINE

## 2020-10-07 PROCEDURE — 36415 COLL VENOUS BLD VENIPUNCTURE: CPT | Mod: ZL | Performed by: FAMILY MEDICINE

## 2020-10-07 PROCEDURE — 87045 FECES CULTURE AEROBIC BACT: CPT | Mod: ZL | Performed by: FAMILY MEDICINE

## 2020-10-07 PROCEDURE — 83516 IMMUNOASSAY NONANTIBODY: CPT | Mod: ZL,59 | Performed by: FAMILY MEDICINE

## 2020-10-07 PROCEDURE — 87015 SPECIMEN INFECT AGNT CONCNTJ: CPT | Mod: ZL | Performed by: FAMILY MEDICINE

## 2020-10-07 PROCEDURE — 84443 ASSAY THYROID STIM HORMONE: CPT | Mod: ZL | Performed by: FAMILY MEDICINE

## 2020-10-07 PROCEDURE — 80053 COMPREHEN METABOLIC PANEL: CPT | Mod: ZL | Performed by: FAMILY MEDICINE

## 2020-10-07 PROCEDURE — 84153 ASSAY OF PSA TOTAL: CPT | Mod: ZL | Performed by: FAMILY MEDICINE

## 2020-10-07 PROCEDURE — 86769 SARS-COV-2 COVID-19 ANTIBODY: CPT | Mod: ZL | Performed by: FAMILY MEDICINE

## 2020-10-07 PROCEDURE — 83516 IMMUNOASSAY NONANTIBODY: CPT | Mod: ZL | Performed by: FAMILY MEDICINE

## 2020-10-07 PROCEDURE — 87046 STOOL CULTR AEROBIC BACT EA: CPT | Mod: ZL,59 | Performed by: FAMILY MEDICINE

## 2020-10-07 PROCEDURE — 85025 COMPLETE CBC W/AUTO DIFF WBC: CPT | Mod: ZL | Performed by: FAMILY MEDICINE

## 2020-10-07 PROCEDURE — G0103 PSA SCREENING: HCPCS | Mod: ZL | Performed by: FAMILY MEDICINE

## 2020-10-07 RX ORDER — TAMSULOSIN HYDROCHLORIDE 0.4 MG/1
0.4 CAPSULE ORAL DAILY
Qty: 30 CAPSULE | Refills: 11 | Status: SHIPPED | OUTPATIENT
Start: 2020-10-07 | End: 2021-10-22

## 2020-10-07 RX ORDER — TAMSULOSIN HYDROCHLORIDE 0.4 MG/1
0.4 CAPSULE ORAL DAILY
Qty: 30 CAPSULE | Refills: 11 | Status: SHIPPED | OUTPATIENT
Start: 2020-10-07 | End: 2020-11-13

## 2020-10-08 LAB
COVID-19 ANTIBODY IGG: NEGATIVE
LAB TEST METHOD: NORMAL
TTG IGA SER-ACNC: 1 U/ML
TTG IGG SER-ACNC: <1 U/ML

## 2020-10-10 LAB
BACTERIA SPEC CULT: NORMAL
E COLI SXT1+2 STL IA: NORMAL
SPECIMEN SOURCE: NORMAL

## 2020-10-19 ENCOUNTER — MYC REFILL (OUTPATIENT)
Dept: UROLOGY | Facility: OTHER | Age: 75
End: 2020-10-19

## 2020-10-19 DIAGNOSIS — R33.9 INCOMPLETE BLADDER EMPTYING: ICD-10-CM

## 2020-10-19 RX ORDER — TAMSULOSIN HYDROCHLORIDE 0.4 MG/1
0.4 CAPSULE ORAL DAILY
Qty: 30 CAPSULE | Refills: 11 | Status: CANCELLED | OUTPATIENT
Start: 2020-10-19

## 2020-10-19 NOTE — TELEPHONE ENCOUNTER
flomax      Last Written Prescription Date:  10/7/20  Last Fill Quantity: 30,   # refills: 11  Last Office Visit: 11/5/19  Future Office visit:       Routing refill request to provider for review/approval because:      
Yes

## 2020-11-13 DIAGNOSIS — R33.9 INCOMPLETE BLADDER EMPTYING: ICD-10-CM

## 2020-11-13 RX ORDER — TAMSULOSIN HYDROCHLORIDE 0.4 MG/1
0.4 CAPSULE ORAL DAILY
Qty: 90 CAPSULE | Refills: 3 | Status: SHIPPED | OUTPATIENT
Start: 2020-11-13 | End: 2021-10-22

## 2020-12-20 ENCOUNTER — HEALTH MAINTENANCE LETTER (OUTPATIENT)
Age: 75
End: 2020-12-20

## 2021-04-18 ENCOUNTER — HEALTH MAINTENANCE LETTER (OUTPATIENT)
Age: 76
End: 2021-04-18

## 2021-10-03 ENCOUNTER — HEALTH MAINTENANCE LETTER (OUTPATIENT)
Age: 76
End: 2021-10-03

## 2021-10-20 NOTE — PROGRESS NOTES
"  Assessment & Plan     Abdominal pain, left lower quadrant  Discussed diagnostic possibilities including diverticulitis.  CT of the abdomen and pelvis with contrast scheduled.  Follow-up as arranged  - CT Abdomen Pelvis w Contrast; Future    Diarrhea, unspecified type  In light of his chronic loose stools TSH is drawn.  Appointment with gastroenterology scheduled for evaluation recommendations for further management  - Adult Gastro Referral - Consult Only; Future  - TSH with free T4 reflex; Future  - TSH with free T4 reflex    Chronic prostatitis  Stable    Incomplete bladder emptying  Refilled tamsulosin as written  - tamsulosin (FLOMAX) 0.4 MG capsule; Take 1 capsule (0.4 mg) by mouth daily  - Vitamin D Deficiency; Future  - Vitamin B12; Future  - Folate; Future  - UA with Microscopic reflex to Culture; Future  - Vitamin D Deficiency  - Vitamin B12  - Folate  - UA with Microscopic reflex to Culture    Acute prostatitis  Cipro to be taken at the onset of symptoms as he will be traveling  - ciprofloxacin (CIPRO) 500 MG tablet; Take 1 tablet (500 mg) by mouth 2 times daily    Exposure to 2019 novel coronavirus  Previous PCR results reviewed    Screening, heart disease, ischemic  Lipid profile drawn.  - CBC with Platelets & Differential; Future  - Comprehensive metabolic panel; Future  - Lipid Profile; Future  - CBC with Platelets & Differential  - Comprehensive metabolic panel  - Lipid Profile    Hyperglycemia  Hemoglobin A1c drawn.  - Hemoglobin A1c; Future  - Hemoglobin A1c    Screening for prostate cancer  PSA drawn.  - Prostate Specific Antigen Screen; Future  - Prostate Specific Antigen Screen         BMI:   Estimated body mass index is 28.7 kg/m  as calculated from the following:    Height as of this encounter: 1.778 m (5' 10\").    Weight as of this encounter: 90.7 kg (200 lb).   Weight management plan: Discussed healthy diet and exercise guidelines    See Patient Instructions    No follow-ups on " file.    Mahad Henley MD  [facility]    Subjective   Ugo is a 76 year old who presents for the following health issues  accompanied by his     HPI     GERD/Heartburn  Onset/Duration: 18 months  Description: Gerd minimal symptoms GI symptoms change in BM  Intensity: moderate  Progression of Symptoms: same  Accompanying Signs & Symptoms:  Does it feel like food gets stuck or trouble swallowing: no  Nausea: no  Vomiting (bloody?): no  Abdominal Pain: YES  Black-Tarry stools: no   Bloody stools: no  Watery stools more frequently  History:  Previous similar episodes: no  Previous ulcers: no  Precipitating factors:   Caffeine use: no  Alcohol use: YES not every day only glass wine  NSAID/Aspirin use: no  Tobacco use: no  Worse with no particular food or drink.  Alleviating factors: None  Therapies tried and outcome:             Lifestyle changes: None            Medications: TUMS 1-2 daily  Ugo is seen for annual wellness exam as well as review of medical problems.  He continues to to have abdominal pain.  This is primarily left lower quadrant.  He also has a history of GERD and has had previous endoscopy as well as colonoscopy.    Unfortunately he has developed frequent diarrhea this occurs daily.  No melena or hematochezia.  Annual Wellness Visit    Patient has been advised of split billing requirements and indicates understanding: Yes     Are you in the first 12 months of your Medicare Part B coverage?  No    Physical Health:    In general, how would you rate your overall physical health? excellent    Outside of work, how many days during the week do you exercise?4-5 days/week    Outside of work, approximately how many minutes a day do you exercise?greater than 60 minutes    If you drink alcohol do you typically have >3 drinks per day or >7 drinks per week? No    Do you usually eat at least 4 servings of fruit and vegetables a day, include whole grains & fiber and avoid regularly eating  "high fat or \"junk\" foods? NO    Do you have any problems taking medications regularly? No    Do you have any side effects from medications? none    Needs assistance for the following daily activities: no assistance needed    Which of the following safety concerns are present in your home?  none identified     Hearing impairment: No    In the past 6 months, have you been bothered by leaking of urine? no    Mental Health:    In general, how would you rate your overall mental or emotional health? excellent  PHQ-2 Score:      Do you feel safe in your environment? Yes    Have you ever done Advance Care Planning? (For example, a Health Directive, POLST, or a discussion with a medical provider or your loved ones about your wishes)? Yes, advance care planning is on file.    Fall risk:  Fallen 2 or more times in the past year?: No  Any fall with injury in the past year?: No    Cognitive Screenin) Repeat 3 items (Leader, Season, Table)      2) Clock draw:   NORMAL  3) 3 item recall:   Recalls 3 objects  Results: 3 items recalled: COGNITIVE IMPAIRMENT LESS LIKELY    Mini-CogTM Copyright S Tati. Licensed by the author for use in [facility] [employer]; reprinted with permission ([email or web address]). All rights reserved.      Do you have sleep apnea, excessive snoring or daytime drowsiness?: no    Current providers sharing in care for this patient include:   Patient Care Team:  Mahad Henley MD as PCP - General  Mahad Henley MD as Assigned PCP    Patient has been advised of split billing requirements and indicates understanding: Yes      Review of Systems   Constitutional, HEENT, cardiovascular, pulmonary, gi and gu systems are negative, except as otherwise noted.      Objective    /80 (BP Location: Left arm, Patient Position: Left side)   Pulse 74   Temp 97.5  F (36.4  C) (Tympanic)   Ht 1.778 m (5' 10\")   Wt 90.7 kg (200 lb)   SpO2 97%   BMI 28.70 kg/m    Body mass index is 28.7 kg/m .  Physical " Exam  Vitals and nursing note reviewed.   Constitutional:       Appearance: He is well-developed.   Neurological:      Mental Status: He is alert and oriented to person, place, and time.   Psychiatric:         Mood and Affect: Mood normal.         Behavior: Behavior normal.         Thought Content: Thought content normal.        Other exam not repeated    Results for orders placed or performed in visit on 10/22/21   Vitamin D Deficiency     Status: Normal   Result Value Ref Range    Vitamin D, Total (25-Hydroxy) 24 20 - 75 ug/L    Narrative    Season, race, dietary intake, and treatment affect the concentration of 25-hydroxy-Vitamin D. Values may decrease during winter months and increase during summer months. Values 20-29 ug/L may indicate Vitamin D insufficiency and values <20 ug/L may indicate Vitamin D deficiency.    Vitamin D determination is routinely performed by an immunoassay specific for 25 hydroxyvitamin D3.  If an individual is on vitamin D2(ergocalciferol) supplementation, please specify 25 OH vitamin D2 and D3 level determination by LCMSMS test VITD23.     Vitamin B12     Status: Normal   Result Value Ref Range    Vitamin B12 442 193 - 986 pg/mL   Folate     Status: Normal   Result Value Ref Range    Folic Acid 6.9 >=5.4 ng/mL   Comprehensive metabolic panel     Status: Abnormal   Result Value Ref Range    Sodium 138 133 - 144 mmol/L    Potassium 3.8 3.4 - 5.3 mmol/L    Chloride 106 94 - 109 mmol/L    Carbon Dioxide (CO2) 27 20 - 32 mmol/L    Anion Gap 5 3 - 14 mmol/L    Urea Nitrogen 16 7 - 30 mg/dL    Creatinine 0.98 0.66 - 1.25 mg/dL    Calcium 8.9 8.5 - 10.1 mg/dL    Glucose 110 (H) 70 - 99 mg/dL    Alkaline Phosphatase 79 40 - 150 U/L    AST 24 0 - 45 U/L    ALT 48 0 - 70 U/L    Protein Total 8.3 6.8 - 8.8 g/dL    Albumin 4.0 3.4 - 5.0 g/dL    Bilirubin Total 1.2 0.2 - 1.3 mg/dL    GFR Estimate 75 >60 mL/min/1.73m2   Lipid Profile     Status: None   Result Value Ref Range    Cholesterol 166 <200  mg/dL    Triglycerides 144 <150 mg/dL    Direct Measure HDL 51 >=40 mg/dL    LDL Cholesterol Calculated 86 <=100 mg/dL    Non HDL Cholesterol 115 <130 mg/dL    Patient Fasting > 8hrs? Yes     Narrative    Cholesterol  Desirable:  <200 mg/dL    Triglycerides  Normal:  Less than 150 mg/dL  Borderline High:  150-199 mg/dL  High:  200-499 mg/dL  Very High:  Greater than or equal to 500 mg/dL    Direct Measure HDL  Female:  Greater than or equal to 50 mg/dL   Male:  Greater than or equal to 40 mg/dL    LDL Cholesterol  Desirable:  <100mg/dL  Above Desirable:  100-129 mg/dL   Borderline High:  130-159 mg/dL   High:  160-189 mg/dL   Very High:  >= 190 mg/dL    Non HDL Cholesterol  Desirable:  130 mg/dL  Above Desirable:  130-159 mg/dL  Borderline High:  160-189 mg/dL  High:  190-219 mg/dL  Very High:  Greater than or equal to 220 mg/dL   TSH with free T4 reflex     Status: Normal   Result Value Ref Range    TSH 2.47 0.40 - 4.00 mU/L   Hemoglobin A1c     Status: Abnormal   Result Value Ref Range    Estimated Average Glucose 120 mg/dL    Hemoglobin A1C 5.8 (H) 0.0 - 5.6 %   UA with Microscopic reflex to Culture     Status: Abnormal    Specimen: Urine, Clean Catch   Result Value Ref Range    Color Urine Light Yellow Colorless, Straw, Light Yellow, Yellow    Appearance Urine Clear Clear    Glucose Urine Negative Negative mg/dL    Bilirubin Urine Negative Negative    Ketones Urine Negative Negative mg/dL    Specific Gravity Urine 1.010 1.003 - 1.035    Blood Urine Negative Negative    pH Urine 5.0 4.7 - 8.0    Protein Albumin Urine Negative Negative mg/dL    Urobilinogen Urine Normal Normal, 2.0 mg/dL    Nitrite Urine Negative Negative    Leukocyte Esterase Urine Negative Negative    Mucus Urine Present (A) None Seen /LPF    RBC Urine 0 <=2 /HPF    WBC Urine 1 <=5 /HPF    Squamous Epithelials Urine 0 <=1 /HPF    Narrative    Urine Culture not indicated   Prostate Specific Antigen Screen     Status: Normal   Result Value Ref  Range    Prostate Specific Antigen Screen 3.45 0.00 - 4.00 ug/L    Narrative    Assay Method:  Chemiluminescence using Siemens   Vista analyzer.   CBC with platelets and differential     Status: None   Result Value Ref Range    WBC Count 5.8 4.0 - 11.0 10e3/uL    RBC Count 4.91 4.40 - 5.90 10e6/uL    Hemoglobin 15.6 13.3 - 17.7 g/dL    Hematocrit 45.9 40.0 - 53.0 %    MCV 94 78 - 100 fL    MCH 31.8 26.5 - 33.0 pg    MCHC 34.0 31.5 - 36.5 g/dL    RDW 12.5 10.0 - 15.0 %    Platelet Count 162 150 - 450 10e3/uL    % Neutrophils 51 %    % Lymphocytes 37 %    % Monocytes 7 %    % Eosinophils 4 %    % Basophils 1 %    % Immature Granulocytes 0 %    NRBCs per 100 WBC 0 <1 /100    Absolute Neutrophils 3.0 1.6 - 8.3 10e3/uL    Absolute Lymphocytes 2.1 0.8 - 5.3 10e3/uL    Absolute Monocytes 0.4 0.0 - 1.3 10e3/uL    Absolute Eosinophils 0.2 0.0 - 0.7 10e3/uL    Absolute Basophils 0.1 0.0 - 0.2 10e3/uL    Absolute Immature Granulocytes 0.0 <=0.0 10e3/uL    Absolute NRBCs 0.0 10e3/uL   CBC with Platelets & Differential     Status: None    Narrative    The following orders were created for panel order CBC with Platelets & Differential.  Procedure                               Abnormality         Status                     ---------                               -----------         ------                     CBC with platelets and d...[[id number]]                      Final result                 Please view results for these tests on the individual orders.

## 2021-10-22 ENCOUNTER — OFFICE VISIT (OUTPATIENT)
Dept: FAMILY MEDICINE | Facility: OTHER | Age: 76
End: 2021-10-22
Attending: FAMILY MEDICINE
Payer: COMMERCIAL

## 2021-10-22 VITALS
WEIGHT: 200 LBS | TEMPERATURE: 97.5 F | DIASTOLIC BLOOD PRESSURE: 80 MMHG | HEIGHT: 70 IN | HEART RATE: 74 BPM | SYSTOLIC BLOOD PRESSURE: 136 MMHG | OXYGEN SATURATION: 97 % | BODY MASS INDEX: 28.63 KG/M2

## 2021-10-22 DIAGNOSIS — R73.9 HYPERGLYCEMIA: ICD-10-CM

## 2021-10-22 DIAGNOSIS — R19.7 DIARRHEA, UNSPECIFIED TYPE: ICD-10-CM

## 2021-10-22 DIAGNOSIS — N41.1 CHRONIC PROSTATITIS: ICD-10-CM

## 2021-10-22 DIAGNOSIS — R33.9 INCOMPLETE BLADDER EMPTYING: ICD-10-CM

## 2021-10-22 DIAGNOSIS — Z20.822 EXPOSURE TO 2019 NOVEL CORONAVIRUS: ICD-10-CM

## 2021-10-22 DIAGNOSIS — R10.32 ABDOMINAL PAIN, LEFT LOWER QUADRANT: Primary | ICD-10-CM

## 2021-10-22 DIAGNOSIS — N41.0 ACUTE PROSTATITIS: ICD-10-CM

## 2021-10-22 DIAGNOSIS — Z12.5 SCREENING FOR PROSTATE CANCER: ICD-10-CM

## 2021-10-22 DIAGNOSIS — Z13.6 SCREENING, HEART DISEASE, ISCHEMIC: ICD-10-CM

## 2021-10-22 LAB
ALBUMIN SERPL-MCNC: 4 G/DL (ref 3.4–5)
ALBUMIN UR-MCNC: NEGATIVE MG/DL
ALP SERPL-CCNC: 79 U/L (ref 40–150)
ALT SERPL W P-5'-P-CCNC: 48 U/L (ref 0–70)
ANION GAP SERPL CALCULATED.3IONS-SCNC: 5 MMOL/L (ref 3–14)
APPEARANCE UR: CLEAR
AST SERPL W P-5'-P-CCNC: 24 U/L (ref 0–45)
BASOPHILS # BLD AUTO: 0.1 10E3/UL (ref 0–0.2)
BASOPHILS NFR BLD AUTO: 1 %
BILIRUB SERPL-MCNC: 1.2 MG/DL (ref 0.2–1.3)
BILIRUB UR QL STRIP: NEGATIVE
BUN SERPL-MCNC: 16 MG/DL (ref 7–30)
CALCIUM SERPL-MCNC: 8.9 MG/DL (ref 8.5–10.1)
CHLORIDE BLD-SCNC: 106 MMOL/L (ref 94–109)
CHOLEST SERPL-MCNC: 166 MG/DL
CO2 SERPL-SCNC: 27 MMOL/L (ref 20–32)
COLOR UR AUTO: ABNORMAL
CREAT SERPL-MCNC: 0.98 MG/DL (ref 0.66–1.25)
EOSINOPHIL # BLD AUTO: 0.2 10E3/UL (ref 0–0.7)
EOSINOPHIL NFR BLD AUTO: 4 %
ERYTHROCYTE [DISTWIDTH] IN BLOOD BY AUTOMATED COUNT: 12.5 % (ref 10–15)
EST. AVERAGE GLUCOSE BLD GHB EST-MCNC: 120 MG/DL
FASTING STATUS PATIENT QL REPORTED: YES
GFR SERPL CREATININE-BSD FRML MDRD: 75 ML/MIN/1.73M2
GLUCOSE BLD-MCNC: 110 MG/DL (ref 70–99)
GLUCOSE UR STRIP-MCNC: NEGATIVE MG/DL
HBA1C MFR BLD: 5.8 % (ref 0–5.6)
HCT VFR BLD AUTO: 45.9 % (ref 40–53)
HDLC SERPL-MCNC: 51 MG/DL
HGB BLD-MCNC: 15.6 G/DL (ref 13.3–17.7)
HGB UR QL STRIP: NEGATIVE
IMM GRANULOCYTES # BLD: 0 10E3/UL
IMM GRANULOCYTES NFR BLD: 0 %
KETONES UR STRIP-MCNC: NEGATIVE MG/DL
LDLC SERPL CALC-MCNC: 86 MG/DL
LEUKOCYTE ESTERASE UR QL STRIP: NEGATIVE
LYMPHOCYTES # BLD AUTO: 2.1 10E3/UL (ref 0.8–5.3)
LYMPHOCYTES NFR BLD AUTO: 37 %
MCH RBC QN AUTO: 31.8 PG (ref 26.5–33)
MCHC RBC AUTO-ENTMCNC: 34 G/DL (ref 31.5–36.5)
MCV RBC AUTO: 94 FL (ref 78–100)
MONOCYTES # BLD AUTO: 0.4 10E3/UL (ref 0–1.3)
MONOCYTES NFR BLD AUTO: 7 %
MUCOUS THREADS #/AREA URNS LPF: PRESENT /LPF
NEUTROPHILS # BLD AUTO: 3 10E3/UL (ref 1.6–8.3)
NEUTROPHILS NFR BLD AUTO: 51 %
NITRATE UR QL: NEGATIVE
NONHDLC SERPL-MCNC: 115 MG/DL
NRBC # BLD AUTO: 0 10E3/UL
NRBC BLD AUTO-RTO: 0 /100
PH UR STRIP: 5 [PH] (ref 4.7–8)
PLATELET # BLD AUTO: 162 10E3/UL (ref 150–450)
POTASSIUM BLD-SCNC: 3.8 MMOL/L (ref 3.4–5.3)
PROT SERPL-MCNC: 8.3 G/DL (ref 6.8–8.8)
PSA SERPL-MCNC: 3.45 UG/L (ref 0–4)
RBC # BLD AUTO: 4.91 10E6/UL (ref 4.4–5.9)
RBC URINE: 0 /HPF
SODIUM SERPL-SCNC: 138 MMOL/L (ref 133–144)
SP GR UR STRIP: 1.01 (ref 1–1.03)
SQUAMOUS EPITHELIAL: 0 /HPF
TRIGL SERPL-MCNC: 144 MG/DL
TSH SERPL DL<=0.005 MIU/L-ACNC: 2.47 MU/L (ref 0.4–4)
UROBILINOGEN UR STRIP-MCNC: NORMAL MG/DL
WBC # BLD AUTO: 5.8 10E3/UL (ref 4–11)
WBC URINE: 1 /HPF

## 2021-10-22 PROCEDURE — 80061 LIPID PANEL: CPT | Mod: ZL | Performed by: FAMILY MEDICINE

## 2021-10-22 PROCEDURE — 36415 COLL VENOUS BLD VENIPUNCTURE: CPT | Mod: ZL | Performed by: FAMILY MEDICINE

## 2021-10-22 PROCEDURE — G0008 ADMIN INFLUENZA VIRUS VAC: HCPCS

## 2021-10-22 PROCEDURE — G0438 PPPS, INITIAL VISIT: HCPCS | Performed by: FAMILY MEDICINE

## 2021-10-22 PROCEDURE — 90662 IIV NO PRSV INCREASED AG IM: CPT

## 2021-10-22 PROCEDURE — 82306 VITAMIN D 25 HYDROXY: CPT | Mod: ZL | Performed by: FAMILY MEDICINE

## 2021-10-22 PROCEDURE — 83036 HEMOGLOBIN GLYCOSYLATED A1C: CPT | Mod: ZL | Performed by: FAMILY MEDICINE

## 2021-10-22 PROCEDURE — 81003 URINALYSIS AUTO W/O SCOPE: CPT | Mod: ZL | Performed by: FAMILY MEDICINE

## 2021-10-22 PROCEDURE — 82040 ASSAY OF SERUM ALBUMIN: CPT | Mod: ZL | Performed by: FAMILY MEDICINE

## 2021-10-22 PROCEDURE — 99213 OFFICE O/P EST LOW 20 MIN: CPT | Mod: 25 | Performed by: FAMILY MEDICINE

## 2021-10-22 PROCEDURE — 82746 ASSAY OF FOLIC ACID SERUM: CPT | Mod: ZL | Performed by: FAMILY MEDICINE

## 2021-10-22 PROCEDURE — 84153 ASSAY OF PSA TOTAL: CPT | Mod: ZL | Performed by: FAMILY MEDICINE

## 2021-10-22 PROCEDURE — G0463 HOSPITAL OUTPT CLINIC VISIT: HCPCS | Mod: 25

## 2021-10-22 PROCEDURE — 84443 ASSAY THYROID STIM HORMONE: CPT | Mod: ZL | Performed by: FAMILY MEDICINE

## 2021-10-22 PROCEDURE — 82607 VITAMIN B-12: CPT | Mod: ZL | Performed by: FAMILY MEDICINE

## 2021-10-22 PROCEDURE — 85025 COMPLETE CBC W/AUTO DIFF WBC: CPT | Mod: ZL | Performed by: FAMILY MEDICINE

## 2021-10-22 RX ORDER — CIPROFLOXACIN 500 MG/1
500 TABLET, FILM COATED ORAL 2 TIMES DAILY
Qty: 28 TABLET | Refills: 0 | Status: SHIPPED | OUTPATIENT
Start: 2021-10-22 | End: 2021-11-24

## 2021-10-22 RX ORDER — TAMSULOSIN HYDROCHLORIDE 0.4 MG/1
0.4 CAPSULE ORAL DAILY
Qty: 90 CAPSULE | Refills: 3 | Status: SHIPPED | OUTPATIENT
Start: 2021-10-22

## 2021-10-22 ASSESSMENT — MIFFLIN-ST. JEOR: SCORE: 1643.44

## 2021-10-22 ASSESSMENT — PAIN SCALES - GENERAL: PAINLEVEL: SEVERE PAIN (6)

## 2021-10-22 NOTE — NURSING NOTE
"No chief complaint on file.      Initial /80 (BP Location: Left arm, Patient Position: Left side)   Pulse 74   Temp 97.5  F (36.4  C) (Tympanic)   Ht 1.778 m (5' 10\")   Wt 90.7 kg (200 lb)   SpO2 97%   BMI 28.70 kg/m   Estimated body mass index is 28.7 kg/m  as calculated from the following:    Height as of this encounter: 1.778 m (5' 10\").    Weight as of this encounter: 90.7 kg (200 lb).  Medication Reconciliation: complete  Patti Billingsley LPN    "

## 2021-10-22 NOTE — NURSING NOTE
"Chief Complaint   Patient presents with     Physical     Diarrhea       Initial /80 (BP Location: Left arm, Patient Position: Left side)   Pulse 74   Temp 97.5  F (36.4  C) (Tympanic)   Ht 1.778 m (5' 10\")   Wt 90.7 kg (200 lb)   SpO2 97%   BMI 28.70 kg/m   Estimated body mass index is 28.7 kg/m  as calculated from the following:    Height as of this encounter: 1.778 m (5' 10\").    Weight as of this encounter: 90.7 kg (200 lb).  Medication Reconciliation: complete  Patti Billingsley LPN  "

## 2021-10-23 LAB
FOLATE SERPL-MCNC: 6.9 NG/ML
VIT B12 SERPL-MCNC: 442 PG/ML (ref 193–986)

## 2021-10-25 LAB — DEPRECATED CALCIDIOL+CALCIFEROL SERPL-MC: 24 UG/L (ref 20–75)

## 2021-10-29 ENCOUNTER — HOSPITAL ENCOUNTER (OUTPATIENT)
Dept: CT IMAGING | Facility: HOSPITAL | Age: 76
Discharge: HOME | End: 2021-10-29
Attending: FAMILY MEDICINE | Admitting: FAMILY MEDICINE
Payer: COMMERCIAL

## 2021-10-29 DIAGNOSIS — R10.32 ABDOMINAL PAIN, LEFT LOWER QUADRANT: ICD-10-CM

## 2021-10-29 PROCEDURE — 74177 CT ABD & PELVIS W/CONTRAST: CPT

## 2021-10-29 PROCEDURE — 250N000011 HC RX IP 250 OP 636: Performed by: RADIOLOGY

## 2021-10-29 RX ORDER — IOPAMIDOL 755 MG/ML
98 INJECTION, SOLUTION INTRAVASCULAR ONCE
Status: COMPLETED | OUTPATIENT
Start: 2021-10-29 | End: 2021-10-29

## 2021-10-29 RX ADMIN — IOPAMIDOL 98 ML: 755 INJECTION, SOLUTION INTRAVENOUS at 14:22

## 2021-11-12 NOTE — PATIENT INSTRUCTIONS
Appointment with Gastroenterology scheduled for evaluation and recommendations for further management. Radiology will call to schedule CT abdomen and pelvis.

## 2021-11-24 ENCOUNTER — OFFICE VISIT (OUTPATIENT)
Dept: UROLOGY | Facility: OTHER | Age: 76
End: 2021-11-24
Attending: UROLOGY
Payer: COMMERCIAL

## 2021-11-24 ENCOUNTER — LAB (OUTPATIENT)
Dept: LAB | Facility: OTHER | Age: 76
End: 2021-11-24
Attending: UROLOGY
Payer: COMMERCIAL

## 2021-11-24 VITALS
BODY MASS INDEX: 28.63 KG/M2 | HEART RATE: 65 BPM | HEIGHT: 70 IN | OXYGEN SATURATION: 96 % | DIASTOLIC BLOOD PRESSURE: 70 MMHG | TEMPERATURE: 97 F | WEIGHT: 200 LBS | SYSTOLIC BLOOD PRESSURE: 140 MMHG

## 2021-11-24 DIAGNOSIS — R97.20 ELEVATED PROSTATE SPECIFIC ANTIGEN (PSA): Primary | ICD-10-CM

## 2021-11-24 DIAGNOSIS — R97.20 ELEVATED PROSTATE SPECIFIC ANTIGEN (PSA): ICD-10-CM

## 2021-11-24 LAB
ALBUMIN UR-MCNC: 20 MG/DL
APPEARANCE UR: ABNORMAL
BILIRUB UR QL STRIP: NEGATIVE
COLOR UR AUTO: YELLOW
GLUCOSE UR STRIP-MCNC: NEGATIVE MG/DL
HGB UR QL STRIP: NEGATIVE
HOLD SPECIMEN: NORMAL
HOLD SPECIMEN: NORMAL
KETONES UR STRIP-MCNC: NEGATIVE MG/DL
LEUKOCYTE ESTERASE UR QL STRIP: NEGATIVE
MUCOUS THREADS #/AREA URNS LPF: PRESENT /LPF
NITRATE UR QL: NEGATIVE
PH UR STRIP: 5.5 [PH] (ref 4.7–8)
PSA SERPL-MCNC: 0.78 UG/L (ref 0–4)
RBC URINE: 0 /HPF
SP GR UR STRIP: 1.02 (ref 1–1.03)
SQUAMOUS EPITHELIAL: 5 /HPF
UROBILINOGEN UR STRIP-MCNC: NORMAL MG/DL
WBC URINE: 0 /HPF

## 2021-11-24 PROCEDURE — 99213 OFFICE O/P EST LOW 20 MIN: CPT | Mod: 25 | Performed by: UROLOGY

## 2021-11-24 PROCEDURE — G0463 HOSPITAL OUTPT CLINIC VISIT: HCPCS

## 2021-11-24 PROCEDURE — G0463 HOSPITAL OUTPT CLINIC VISIT: HCPCS | Mod: 25

## 2021-11-24 PROCEDURE — 81001 URINALYSIS AUTO W/SCOPE: CPT | Mod: ZL

## 2021-11-24 PROCEDURE — 51798 US URINE CAPACITY MEASURE: CPT | Mod: 26 | Performed by: UROLOGY

## 2021-11-24 PROCEDURE — 36415 COLL VENOUS BLD VENIPUNCTURE: CPT | Mod: ZL

## 2021-11-24 PROCEDURE — 84153 ASSAY OF PSA TOTAL: CPT | Mod: ZL

## 2021-11-24 PROCEDURE — 51798 US URINE CAPACITY MEASURE: CPT

## 2021-11-24 ASSESSMENT — ENCOUNTER SYMPTOMS
NAUSEA: 1
DIARRHEA: 1

## 2021-11-24 ASSESSMENT — MIFFLIN-ST. JEOR: SCORE: 1643.44

## 2021-11-24 ASSESSMENT — PAIN SCALES - GENERAL: PAINLEVEL: NO PAIN (0)

## 2021-11-24 NOTE — PROGRESS NOTES
History     Chief Complaint:      Elevated PSA      HPI   Ugo Moss is a 76 year old male who presents for further follow-up. I saw Ugo about 2 years ago for what we see suspected was a prostatitis. I put him on an antibiotic and his symptoms did resolve. About a year ago around Covid he was visiting a friend in [place] there was some complaints of gastrointestinal issues and then Ugo came home and had similar complaints. He continues to have GI issues since that visit. He will have loose stools 3-4 times a day and pain in the left lower abdomen. 2 years ago when I saw him he was not emptying his bladder and we talked about further evaluation but his symptoms got better. On today's evaluation his postvoid residual is 456 which is still significant. Because of these GI symptoms he recently had a CT scan and I have reviewed that and that shows a very distended bladder and there is abnormality within the wall of the bladder which is likely due to muscular hypertrophy but I cannot rule out concerns for a lesion in the wall of the bladder either. Ugo also had a PSA checked last month which was elevated at 3.45 and so we are rechecking that today. He has a significant family history of prostate cancer.    Allergies:    Allergies   Allergen Reactions     Amoxicillin      Amoxicillin Trihydrate Other (See Comments)     Augmentin       Clavulanic Acid Potassium Other (See Comments)     Augmentin          Medications:      aspirin 81 MG EC tablet  tamsulosin (FLOMAX) 0.4 MG capsule        Problem List:      Patient Active Problem List    Diagnosis Date Noted     Chronic prostatitis 10/22/2021     Priority: Medium     Screening for prostate cancer 09/30/2019     Priority: Medium     Pina's esophagus without dysplasia 01/04/2019     Priority: Medium     Gout, recurrent 04/28/2017     Priority: Medium     BPH (benign prostatic hyperplasia) 10/28/2016     Priority: Medium        Past Medical History:      Past  "Medical History:   Diagnosis Date     BPH 11/28/2011     Erectile Dysfunction 11/28/2011       Past Surgical History:      Past Surgical History:   Procedure Laterality Date     ORTHOPEDIC SURGERY Left     Knee mensical repair     VASECTOMY         Family History:      Family History   Problem Relation Age of Onset     Diabetes Mother      Heart Disease Mother      Stomach Cancer Father      Diabetes Brother      Prostate Cancer Brother      Stomach Cancer Brother        Social History:    Marital Status:   [4]  Social History     Tobacco Use     Smoking status: Never Smoker     Smokeless tobacco: Never Used   Substance Use Topics     Alcohol use: Yes     Comment: wine 3-4 times weekly     Drug use: No        Review of Systems   Gastrointestinal: Positive for diarrhea and nausea.   All other systems reviewed and are negative.        Physical Exam   Vitals:  BP (!) 140/70 (BP Location: Left arm, Patient Position: Chair, Cuff Size: Adult Regular)   Pulse 65   Temp 97  F (36.1  C)   Ht 1.778 m (5' 10\")   Wt 90.7 kg (200 lb)   SpO2 96%   BMI 28.70 kg/m        Physical Exam  Constitutional:       Appearance: Normal appearance. He is normal weight.   Pulmonary:      Effort: Pulmonary effort is normal.   Abdominal:      General: There is distension.      Palpations: Abdomen is soft.      Tenderness: There is no abdominal tenderness.   Genitourinary:     Comments: External rectal area is normal, normal rectal tone. Prostate is small may be 1520 g and feels perfectly smooth. No rectal masses.  Neurological:      Mental Status: He is alert.       PSA   Date Value Ref Range Status   10/07/2020 0.69 0 - 4 ug/L Final     Comment:     Assay Method:  Chemiluminescence using Siemens Vista analyzer   09/30/2019 0.80 0 - 4 ug/L Final     Comment:     Assay Method:  Chemiluminescence using Siemens Vista analyzer   01/08/2019 0.70 0 - 4 ug/L Final     Comment:     Assay Method:  Chemiluminescence using Siemens Vista " analyzer   04/28/2017 0.78 0 - 4 ug/L Final     Comment:     Assay Method:  Chemiluminescence using Siemens Vista analyzer   10/28/2016 0.83 0 - 4 ug/L Final     Comment:     Assay Method:  Chemiluminescence using Siemens Vista analyzer   12/02/2014 0.77 0 - 4 ug/L Final     Prostate Specific Antigen Screen   Date Value Ref Range Status   10/22/2021 3.45 0.00 - 4.00 ug/L Final     PSA Tumor Marker   Date Value Ref Range Status   11/24/2021 0.78 0.00 - 4.00 ug/L Final     Post void residual: 456 mL    AUA symptom score: 6    Impression: Incomplete bladder emptying    Plan   Plan: Ugo's PSA came back within the normal range so I do not think we need to do anything further regarding that his prostate feels normal. I am still very concerned about this incomplete bladder emptying. He is not currently getting into any trouble with this. I am not seeing any hydronephrosis on his CT scan and his renal function is normal but this does concern me. I do not know if he has had this all of his life and I do not know the etiology of it. The bladder is not normal on the CT and I am encouraged him to have further evaluation with a cystoscopy. He is very worried about potential scar tissue with these kinds of procedures but that is highly unlikely with just a diagnostic cystoscopy. If he has a urethral stricture we would have to refer him out for further treatment if he has bladder sphincter dyssynergia then he is likely had that his whole life and we would just continue to monitor him or he could start intermittent catheterizations. Occasionally and bladder neck incision can help with emptying but further evaluation is recommended at this time. He was given my card with our number and he is to call us if he decides to proceed. Two 5-minute spent on counseling, coordination of care and documentation.     No follow-ups on file.    Maritza Victoria MD  [facility]

## 2021-11-24 NOTE — LETTER
11/24/2021      RE: Ugo Moss  [address] [address]         History     Chief Complaint:      Elevated PSA      HPI   Ugo Moss is a 76 year old male who presents for further follow-up. I saw Ugo about 2 years ago for what we see suspected was a prostatitis. I put him on an antibiotic and his symptoms did resolve. About a year ago around Covid he was visiting a friend in [place] there was some complaints of gastrointestinal issues and then Ugo came home and had similar complaints. He continues to have GI issues since that visit. He will have loose stools 3-4 times a day and pain in the left lower abdomen. 2 years ago when I saw him he was not emptying his bladder and we talked about further evaluation but his symptoms got better. On today's evaluation his postvoid residual is 456 which is still significant. Because of these GI symptoms he recently had a CT scan and I have reviewed that and that shows a very distended bladder and there is abnormality within the wall of the bladder which is likely due to muscular hypertrophy but I cannot rule out concerns for a lesion in the wall of the bladder either. Ugo also had a PSA checked last month which was elevated at 3.45 and so we are rechecking that today. He has a significant family history of prostate cancer.    Allergies:    Allergies   Allergen Reactions     Amoxicillin      Amoxicillin Trihydrate Other (See Comments)     Augmentin       Clavulanic Acid Potassium Other (See Comments)     Augmentin          Medications:      aspirin 81 MG EC tablet  tamsulosin (FLOMAX) 0.4 MG capsule        Problem List:      Patient Active Problem List    Diagnosis Date Noted     Chronic prostatitis 10/22/2021     Priority: Medium     Screening for prostate cancer 09/30/2019     Priority: Medium     Pina's esophagus without dysplasia 01/04/2019     Priority: Medium     Gout, recurrent 04/28/2017     Priority: Medium     BPH (benign prostatic  "hyperplasia) 10/28/2016     Priority: Medium        Past Medical History:      Past Medical History:   Diagnosis Date     BPH 11/28/2011     Erectile Dysfunction 11/28/2011       Past Surgical History:      Past Surgical History:   Procedure Laterality Date     ORTHOPEDIC SURGERY Left     Knee mensical repair     VASECTOMY         Family History:      Family History   Problem Relation Age of Onset     Diabetes Mother      Heart Disease Mother      Stomach Cancer Father      Diabetes Brother      Prostate Cancer Brother      Stomach Cancer Brother        Social History:    Marital Status:   [4]  Social History     Tobacco Use     Smoking status: Never Smoker     Smokeless tobacco: Never Used   Substance Use Topics     Alcohol use: Yes     Comment: wine 3-4 times weekly     Drug use: No        Review of Systems   Gastrointestinal: Positive for diarrhea and nausea.   All other systems reviewed and are negative.        Physical Exam   Vitals:  BP (!) 140/70 (BP Location: Left arm, Patient Position: Chair, Cuff Size: Adult Regular)   Pulse 65   Temp 97  F (36.1  C)   Ht 1.778 m (5' 10\")   Wt 90.7 kg (200 lb)   SpO2 96%   BMI 28.70 kg/m        Physical Exam  Constitutional:       Appearance: Normal appearance. He is normal weight.   Pulmonary:      Effort: Pulmonary effort is normal.   Abdominal:      General: There is distension.      Palpations: Abdomen is soft.      Tenderness: There is no abdominal tenderness.   Genitourinary:     Comments: External rectal area is normal, normal rectal tone. Prostate is small may be 1520 g and feels perfectly smooth. No rectal masses.  Neurological:      Mental Status: He is alert.       PSA   Date Value Ref Range Status   10/07/2020 0.69 0 - 4 ug/L Final     Comment:     Assay Method:  Chemiluminescence using Siemens Vista analyzer   09/30/2019 0.80 0 - 4 ug/L Final     Comment:     Assay Method:  Chemiluminescence using Siemens Vista analyzer   01/08/2019 0.70 0 - 4 " ug/L Final     Comment:     Assay Method:  Chemiluminescence using Siemens Vista analyzer   04/28/2017 0.78 0 - 4 ug/L Final     Comment:     Assay Method:  Chemiluminescence using Siemens Vista analyzer   10/28/2016 0.83 0 - 4 ug/L Final     Comment:     Assay Method:  Chemiluminescence using Siemens Vista analyzer   12/02/2014 0.77 0 - 4 ug/L Final     Prostate Specific Antigen Screen   Date Value Ref Range Status   10/22/2021 3.45 0.00 - 4.00 ug/L Final     PSA Tumor Marker   Date Value Ref Range Status   11/24/2021 0.78 0.00 - 4.00 ug/L Final     Post void residual: 456 mL    AUA symptom score: 6    Impression: Incomplete bladder emptying    Plan   Plan: Ugo's PSA came back within the normal range so I do not think we need to do anything further regarding that his prostate feels normal. I am still very concerned about this incomplete bladder emptying. He is not currently getting into any trouble with this. I am not seeing any hydronephrosis on his CT scan and his renal function is normal but this does concern me. I do not know if he has had this all of his life and I do not know the etiology of it. The bladder is not normal on the CT and I am encouraged him to have further evaluation with a cystoscopy. He is very worried about potential scar tissue with these kinds of procedures but that is highly unlikely with just a diagnostic cystoscopy. If he has a urethral stricture we would have to refer him out for further treatment if he has bladder sphincter dyssynergia then he is likely had that his whole life and we would just continue to monitor him or he could start intermittent catheterizations. Occasionally and bladder neck incision can help with emptying but further evaluation is recommended at this time. He was given my card with our number and he is to call us if he decides to proceed. Two 5-minute spent on counseling, coordination of care and documentation.     No follow-ups on file.    Maritza Victoria,  MD  [facility] - FAISAL Victoria MD

## 2021-11-24 NOTE — NURSING NOTE
"Chief Complaint   Patient presents with     Elevated PSA       Initial BP (!) 140/70 (BP Location: Left arm, Patient Position: Chair, Cuff Size: Adult Regular)   Pulse 65   Temp 97  F (36.1  C)   Ht 1.778 m (5' 10\")   Wt 90.7 kg (200 lb)   SpO2 96%   BMI 28.70 kg/m   Estimated body mass index is 28.7 kg/m  as calculated from the following:    Height as of this encounter: 1.778 m (5' 10\").    Weight as of this encounter: 90.7 kg (200 lb).  Medication Reconciliation: complete  TAZ ZAMUDIO LPN      Review of Systems:    Weight loss:    No     Recent fever/chills:  No   Night sweats:   No  Current skin rash:  No   Recent hair loss:  No  Heat intolerance:  No   Cold intolerance:  No  Chest pain:   No   Palpitations:   No  Shortness of breath:  No   Wheezing:   No  Constipation:    No   Diarrhea:   yes   Nausea:   yes   Vomiting:   No   Kidney/side pain:  No   Back pain:   No  Frequent headaches:  No   Dizziness:     No  Leg swelling:   No   Calf pain:    No    TAZ ZAMUDIO LPN      "

## 2021-12-15 DIAGNOSIS — R33.9 INCOMPLETE BLADDER EMPTYING: Primary | ICD-10-CM

## 2021-12-15 RX ORDER — TAMSULOSIN HYDROCHLORIDE 0.4 MG/1
CAPSULE ORAL
Qty: 60 CAPSULE | Refills: 11 | Status: SHIPPED | OUTPATIENT
Start: 2021-12-15

## 2021-12-16 ENCOUNTER — TRANSFERRED RECORDS (OUTPATIENT)
Dept: HEALTH INFORMATION MANAGEMENT | Facility: CLINIC | Age: 76
End: 2021-12-16

## 2022-09-04 ENCOUNTER — HEALTH MAINTENANCE LETTER (OUTPATIENT)
Age: 77
End: 2022-09-04

## 2023-01-15 ENCOUNTER — HEALTH MAINTENANCE LETTER (OUTPATIENT)
Age: 78
End: 2023-01-15

## 2024-02-18 ENCOUNTER — HEALTH MAINTENANCE LETTER (OUTPATIENT)
Age: 79
End: 2024-02-18

## 2025-02-11 ENCOUNTER — TELEPHONE (OUTPATIENT)
Dept: UROLOGY | Facility: CLINIC | Age: 80
End: 2025-02-11

## 2025-02-11 NOTE — TELEPHONE ENCOUNTER
Patient Details  Patient's Full Name  Ugo Moss  Patient's Date of Birth  1945  Patient's Phone Number  [phone]  Patient's Email ID  [email or web address]  Has the patient visited [email or web address] in the past 3 years?  Yes  Address Details  Address  [address] [place]  [address]  Appointment Preferences  Reason for appointment  Urethral stricture   Preferred Provider  Micah Blake  Preferred Location  [place]   Preferred Visit Type  In-person   Services   Do you need an  for your appointment?  No  Billing Preference  Which billing situation applies to the patient?  Patient has insurance  Insurance Information  Insurance Provider Name  [employer]  Member ID/ Policy Number  [id number]  Insurance Contact for Provider  [phone]  Policy [place]  Is the patient the Insurance Policy burton/subscriber?  Yes, patient is the policy burton  Callback Preferences  Could you share your preferred callback time with us?  No, I don't have a preference